# Patient Record
Sex: FEMALE | Race: WHITE | NOT HISPANIC OR LATINO | Employment: OTHER | URBAN - METROPOLITAN AREA
[De-identification: names, ages, dates, MRNs, and addresses within clinical notes are randomized per-mention and may not be internally consistent; named-entity substitution may affect disease eponyms.]

---

## 2017-02-22 PROBLEM — M70.61 GREATER TROCHANTERIC BURSITIS OF RIGHT HIP: Status: ACTIVE | Noted: 2017-02-22

## 2017-02-22 PROBLEM — M25.561 RIGHT KNEE PAIN: Status: ACTIVE | Noted: 2017-02-22

## 2017-03-27 PROBLEM — M75.42 ROTATOR CUFF IMPINGEMENT SYNDROME OF LEFT SHOULDER: Status: ACTIVE | Noted: 2017-03-27

## 2017-03-31 ENCOUNTER — TELEPHONE (OUTPATIENT)
Dept: NEUROSURGERY | Facility: CLINIC | Age: 62
End: 2017-03-31

## 2017-03-31 DIAGNOSIS — M54.5 LOW BACK PAIN, UNSPECIFIED BACK PAIN LATERALITY, UNSPECIFIED CHRONICITY, WITH SCIATICA PRESENCE UNSPECIFIED: Primary | ICD-10-CM

## 2017-04-04 ENCOUNTER — OFFICE VISIT (OUTPATIENT)
Dept: NEUROSURGERY | Facility: CLINIC | Age: 62
End: 2017-04-04
Payer: MEDICAID

## 2017-04-04 ENCOUNTER — HOSPITAL ENCOUNTER (OUTPATIENT)
Dept: RADIOLOGY | Facility: HOSPITAL | Age: 62
Discharge: HOME | End: 2017-04-04
Attending: NEUROLOGICAL SURGERY
Payer: MEDICAID

## 2017-04-04 VITALS
DIASTOLIC BLOOD PRESSURE: 70 MMHG | WEIGHT: 149 LBS | HEART RATE: 79 BPM | TEMPERATURE: 99 F | BODY MASS INDEX: 28.13 KG/M2 | SYSTOLIC BLOOD PRESSURE: 107 MMHG | HEIGHT: 61 IN

## 2017-04-04 DIAGNOSIS — M54.5 LOW BACK PAIN, UNSPECIFIED BACK PAIN LATERALITY, UNSPECIFIED CHRONICITY, WITH SCIATICA PRESENCE UNSPECIFIED: ICD-10-CM

## 2017-04-04 DIAGNOSIS — M54.16 LUMBAR RADICULAR PAIN: ICD-10-CM

## 2017-04-04 DIAGNOSIS — M50.10 CERVICAL DISC DISORDER WITH RADICULOPATHY: Primary | ICD-10-CM

## 2017-04-04 PROCEDURE — 99999 PR PBB SHADOW E&M-EST. PATIENT-LVL III: CPT | Mod: PBBFAC,,, | Performed by: NEUROLOGICAL SURGERY

## 2017-04-04 PROCEDURE — 72100 X-RAY EXAM L-S SPINE 2/3 VWS: CPT | Mod: 26,,, | Performed by: RADIOLOGY

## 2017-04-04 PROCEDURE — 99204 OFFICE O/P NEW MOD 45 MIN: CPT | Mod: S$PBB,,, | Performed by: NEUROLOGICAL SURGERY

## 2017-04-04 PROCEDURE — 99213 OFFICE O/P EST LOW 20 MIN: CPT | Mod: PBBFAC | Performed by: NEUROLOGICAL SURGERY

## 2017-04-04 PROCEDURE — 72120 X-RAY BEND ONLY L-S SPINE: CPT | Mod: 26,,, | Performed by: RADIOLOGY

## 2017-04-04 RX ORDER — ROSUVASTATIN CALCIUM 40 MG/1
40 TABLET, COATED ORAL NIGHTLY
COMMUNITY
End: 2017-06-15

## 2017-04-04 NOTE — PROGRESS NOTES
Subjective:    I, Luciano Salinas, am scribing for, and in the presence of, Dr. Manzanares.     Patient ID: Irish Felder is a 61 y.o. female.    Chief Complaint: Lumbar Spine Pain (L-Spine)    HPI   This is a 61 y.o. female presents complaining of several-year history of neck and back pain, treated in the past with pain medication, physical therapy, multiple injections, multiple rhizotomies, but has continued to have neck pain radiating to bilateral upper extremities and back pain radiating to bilateral lower extremities. Pain is equal on both sides. She elaborates having had 2 rhizotomies to her neck, 7 rhizotomies to her back, 6 injections to her back, without any significant relief. She also recently had a fall with injury to her right knee, currently undergoing physical therapy. She reports having had workup with MRI in the past, done over 1 year ago, which reportedly had shown bulging discs in her neck and back. She does not have her MRI scans with her today. Associated symptoms include paresthesias/numbness to her left hand including the 1st and 2nd digits.      Review of Systems   Constitutional: Negative for chills, fatigue and fever.   HENT: Negative for sinus pressure and trouble swallowing.    Eyes: Negative.  Negative for visual disturbance.   Respiratory: Negative.    Cardiovascular: Negative.    Gastrointestinal: Negative.  Negative for nausea and vomiting.   Endocrine: Negative.    Genitourinary: Negative.    Musculoskeletal: Positive for neck pain (radiates to bilateral upper extremities). Back pain: radiates to bilateral lower extremities.   Neurological: Positive for numbness (with paresthesias to bilateral lower extrmities. ). Negative for dizziness, seizures, syncope, speech difficulty and weakness.       Objective:      Physical Exam:    Constitutional: She appears well-developed.     Eyes: Pupils are equal, round, and reactive to light. Conjunctivae and EOM are normal.     Cardiovascular: Normal rate,  regular rhythm, normal pulses and intact distal pulses.     Abdominal: Soft.     Psych/Behavior: She is alert. She is oriented to person, place, and time. She has a normal mood and affect.     Musculoskeletal: Gait is normal.        Neck: Range of motion is full. There is no tenderness. Muscle strength is 5/5. Tone is normal.        Back: Range of motion is full. There is no tenderness. Muscle strength is 5/5. Tone is normal.        Right Upper Extremities: Range of motion is full. There is no tenderness. Muscle strength is 5/5. Tone is normal.        Left Upper Extremities: Range of motion is full. There is no tenderness. Muscle strength is 5/5. Tone is normal.       Right Lower Extremities: Range of motion is full. There is no tenderness. Muscle strength is 5/5. Tone is normal.        Left Lower Extremities: Range of motion is full. There is no tenderness. Muscle strength is 5/5. Tone is normal.     Neurological:        Coordination: She has a normal Romberg Test, normal finger to nose coordination, normal heel to shin coordination and normal tandem walking coordination.        Sensory: There is no sensory deficit in the trunk. There is no sensory deficit in the extremities.        DTRs: DTRs are normal. Tricep reflexes are 2+ on the right side and 2+ on the left side. Bicep reflexes are 2+ on the right side and 2+ on the left side. Brachioradialis reflexes are 2+ on the right side and 2+ on the left side. Patellar reflexes are 2+ on the right side and 2+ on the left side. Achilles reflexes are 2+ on the right side and 2+ on the left side. She displays no Babinski's sign on the right side. She displays no Babinski's sign on the left side.        Cranial nerves: Cranial nerve(s) II, III, IV, V, VI, VII, VIII, IX, X, XI and XII are intact.       Pt has no focal deficits.   There is evidence of possible cervical radiculopathy but no cervical myelopathy.   No Wilson's, no clonus.   Negative SLR.     Imaging:   XR  L-spine with Flexion/Extension, shows no fractures or malalignment.     Assessment/Plan:   Pt with long history of neck and back issues that have been managed in [place], now lives here. Unfortunately I have no records or any of her old imaging. Lumbar x-rays were non-diagnostic. I have advised her to obtain copies of her old scans from [place]. We will get updated imaging since she says it has been over a year since her last scan, and we will have her her follow up with one of our PAs in 4-6 weeks.     I, Dr. Manzanares, personally performed the services described in this documentation as scribed by Luciano Salinas in my presence, and it is both accurate and complete.

## 2017-05-10 PROBLEM — M17.11 PRIMARY OSTEOARTHRITIS OF RIGHT KNEE: Status: ACTIVE | Noted: 2017-05-10

## 2017-05-16 ENCOUNTER — INITIAL CONSULT (OUTPATIENT)
Dept: PHYSICAL MEDICINE AND REHAB | Facility: CLINIC | Age: 62
End: 2017-05-16
Payer: MEDICAID

## 2017-05-16 VITALS
DIASTOLIC BLOOD PRESSURE: 75 MMHG | HEART RATE: 81 BPM | WEIGHT: 149 LBS | HEIGHT: 61 IN | BODY MASS INDEX: 28.13 KG/M2 | SYSTOLIC BLOOD PRESSURE: 127 MMHG

## 2017-05-16 DIAGNOSIS — M17.11 PRIMARY OSTEOARTHRITIS OF RIGHT KNEE: ICD-10-CM

## 2017-05-16 DIAGNOSIS — G89.29 CHRONIC PAIN OF RIGHT KNEE: Primary | ICD-10-CM

## 2017-05-16 DIAGNOSIS — M25.561 CHRONIC PAIN OF RIGHT KNEE: Primary | ICD-10-CM

## 2017-05-16 PROCEDURE — 99999 PR PBB SHADOW E&M-EST. PATIENT-LVL II: CPT | Mod: PBBFAC,,, | Performed by: PHYSICAL MEDICINE & REHABILITATION

## 2017-05-16 PROCEDURE — 99212 OFFICE O/P EST SF 10 MIN: CPT | Mod: PBBFAC,PO | Performed by: PHYSICAL MEDICINE & REHABILITATION

## 2017-05-16 PROCEDURE — 99204 OFFICE O/P NEW MOD 45 MIN: CPT | Mod: S$PBB,,, | Performed by: PHYSICAL MEDICINE & REHABILITATION

## 2017-05-16 RX ORDER — LIDOCAINE 50 MG/G
1 PATCH TOPICAL DAILY
Qty: 30 PATCH | Refills: 1 | Status: SHIPPED | OUTPATIENT
Start: 2017-05-16 | End: 2017-06-15

## 2017-05-16 RX ORDER — PRAVASTATIN SODIUM 80 MG/1
TABLET ORAL
Refills: 0 | COMMUNITY
Start: 2017-05-11 | End: 2017-08-08

## 2017-05-16 RX ORDER — DICYCLOMINE HYDROCHLORIDE 10 MG/1
CAPSULE ORAL
Refills: 3 | COMMUNITY
Start: 2017-05-11 | End: 2017-06-15

## 2017-05-16 NOTE — LETTER
May 16, 2017      [place]  [address]  [address]  [facility]  [facility] [address]           [facility]  [address] [facility] [address]  Phone: [phone]  Fax: [phone]          Patient: Irish Felder   MR Number: [id number]   YOB: 1955   Date of Visit: 5/16/2017       Dear Dr. William Pack II:    Thank you for referring Irish Felder to me for evaluation. Attached you will find relevant portions of my assessment and plan of care.    If you have questions, please do not hesitate to call me. I look forward to following Irish Felder along with you.    Sincerely,    Adria Anguiano MD    Enclosure  CC:  No Recipients    If you would like to receive this communication electronically, please contact [email or web address] or [phone] to request more information on [facility] Link access.    For providers and/or their staff who would like to refer a patient to [facility], please contact us through our one-stop-shop provider referral line, [facility], at [phone].    If you feel you have received this communication in error or would no longer like to receive these types of communications, please e-mail [email or web address]

## 2017-05-17 NOTE — PROGRESS NOTES
[facility]    Consulting Provider: Dr. William Pack II    CHIEF COMPLAINT:   Chief Complaint   Patient presents with    Knee Pain     right knee pain - dr Pack referral     HISTORY OF PRESENT ILLNESS: Irish Felder is a 61 y.o. female who presents to me for the first time for evaluation and treatment of right knee pain.  She's had chronic pain in the right knee for the past few years.  She attributes increasing pain recently to a fall in January of this year.  She fell onto the anterior aspect of the knee.  She rates her pain as a 9 on a scale 1-10.  The pain is most prominent over the lateral aspect of the right knee.  The pain will radiate into the anterior shin at times.  She reports that massaging the lateral knee seems to reduce the pain.  She did receive a injection of corticosteroid to the right knee last week, but unfortunately she notes minimal relief from this.  She notes occasional mild swelling of the knee.  She denies any locking symptoms.  Her pain is relatively constant.  She is not currently taking any oral medicine for pain.  She has tried a topical compounded pain cream without relief.  The pain is achy in nature.  The pain is increased with increased use.  She denies any numbness or tingling of the right lower extremity.    Review of Systems   Constitutional: Negative for fever.   HENT: Negative for drooling.    Eyes: Negative for discharge.   Respiratory: Negative for choking.    Cardiovascular: Negative for chest pain.   Genitourinary: Negative for flank pain.   Skin: Negative for wound.   Allergic/Immunologic: Negative for immunocompromised state.   Neurological: Negative for tremors and syncope.   Psychiatric/Behavioral: Negative for behavioral problems.     Past Medical History:   Past Medical History:   Diagnosis Date    Anemia     Cancer     skin cancer - nose    GERD (gastroesophageal reflux disease)     Hyperlipidemia     Seasonal allergies         Past Surgical History:   Past Surgical History:   Procedure Laterality Date    CATARACT EXTRACTION  10/15/12    right eye    CATARACT EXTRACTION  10/1/12    left eye    COLONOSCOPY      EYE SURGERY      bilateral cataracts    LASIK      OU    UPPER GASTROINTESTINAL ENDOSCOPY         Family History:   Family History   Problem Relation Age of Onset    Cataracts Mother     Cancer Mother      lung    Diabetes Father     Cancer Father      lung    Cataracts Sister        Medications:   Current Outpatient Prescriptions on File Prior to Visit   Medication Sig Dispense Refill    calcium carbonate (CALCIUM CARBONATE) 300 mg (750 mg) Chew Take by mouth as needed.        chlorphen-pseudoeph-ibuprofen (ADVIL ALLERGY SINUS) 2- mg Tab Take by mouth.        cyanocobalamin 1,000 mcg/mL injection       fenofibrate (TRICOR) 54 MG tablet       fluoxetine (PROZAC) 20 MG capsule Take 20 mg by mouth once daily.        furosemide (LASIX) 20 MG tablet TAKE 1 TABLET PO QD PRN  3    lansoprazole (PREVACID) 15 MG capsule TK ONE C PO BID  6    multivitamin capsule Take 1 capsule by mouth once daily.        nitroGLYCERIN (NITROSTAT) 0.4 MG SL tablet TK ONE T PO PRN  6    pantoprazole (PROTONIX) 40 MG tablet TK 1 T PO QD  3    potassium chloride (MICRO-K) 10 MEQ CpSR TAKE 1 CAPSULE PO QD PRN WITH FOOD  3    promethazine (PHENERGAN) 25 MG tablet TK 1 T PO Q 4 TO 6 H PRN N  0    pseudoephedrine-guaifenesin  mg (MUCINEX D)  mg per tablet Take 1 tablet by mouth every 12 (twelve) hours.        rosuvastatin (CRESTOR) 40 MG Tab Take 40 mg by mouth every evening.      trazodone (DESYREL) 50 MG tablet Take 50 mg by mouth every evening. Takes 1/2 tablet HS       No current facility-administered medications on file prior to visit.        Allergies: Review of patient's allergies indicates:  No Known Allergies    Social History:   Social History     Social History    Marital status:      Spouse name:  "N/A    Number of children: N/A    Years of education: N/A     Social History Main Topics    Smoking status: Never Smoker    Smokeless tobacco: None    Alcohol use Yes      Comment: socially     Drug use: No    Sexual activity: Not Asked     Other Topics Concern    None     Social History Narrative     PHYSICAL EXAMINATION:   General    Vitals:    05/16/17 1506   BP: 127/75   Pulse: 81   Weight: 67.6 kg (149 lb)   Height: 5' 1" (1.549 m)     Constitutional: Oriented to person, place, and time. No apparent distress. Appears well-developed and well-nourished. Pleasant.  HENT:   Head: Normocephalic and atraumatic.   Eyes: Right eye exhibits no discharge. Left eye exhibits no discharge. No scleral icterus.   Pulmonary/Chest: Effort normal. No respiratory distress.   Abdominal: There is no guarding.   Neurological: Alert and oriented to person, place, and time.   Psychiatric: Behavior is normal.   Right Knee Exam     Tenderness   The patient is experiencing tenderness in the lateral joint line (There is also some tenderness at the lateral retropatellar facet).    Range of Motion   Extension: 0   Flexion: 140 (There is some lateral knee pain with terminal flexion)     Tests   Florence:  Medial - negative Lateral - positive  Lachman:  Anterior - negative    Posterior - negative  Varus: negative  Valgus: negative  Patellar Apprehension: negative    Other   Erythema: absent  Scars: absent  Sensation: normal  Pulse: present  Swelling: mild  Other tests: no effusion present      Left Knee Exam   Left knee exam is normal.    Tenderness   The patient is experiencing no tenderness.         Range of Motion   Extension: normal   Flexion: normal     Muscle Strength     The patient has normal left knee strength.    Other   Erythema: absent  Scars: absent  Sensation: normal  Pulse: present  Swelling: none  Effusion: no effusion present        INSPECTION: There is no ecchymoses, erythema or gross deformity about the right " knee.  GAIT/DYNAMIC: Her gait is normal.    Imaging  MRI of the right knee from 5/1/2017: 1. Flap tear reaching the inferior articular surface of the posterior horn of the lateral meniscus is suspected with free edge truncation suggestive of a radial component to the tear.  2. Flap tear reaching the inferior articular surface of the posterior horn and posterior medial corner of the medial meniscus is suspected.  3. Mild increased signal about the MCL may be related to reactive changes from the medial compartment pathology in the absence of history or clinical findings suggestive of MCL injury. If there is evidence of MCL injury, low grade MCL strain may be considered.  4. Mild quadriceps tendinosis.  5. Tricompartmental arthritic changes are seen, most pronounced in the patellofemoral compartment.  6. Moderate joint effusion. Baker's cyst. Additional findings and details as above.    X-ray of the right knee from 2/22/2017: Mild narrowing of the medial compartment, mild periarticular subchondral sclerosis both medial and lateral compartment bilaterally, right knee patellofemoral joint moderate joint space narrowing and possible degenerative subchondral cystic change at the medial facet.    Data Reviewed: X-ray, MRI    Supportive Actions: Independent visualization of images or test specimens    ASSESSMENT:   1. Chronic pain of right knee    2. Primary osteoarthritis of right knee      PLAN:     1. Time was spent reviewing the above diagnosis in depth with Irish today, including acute management and rehabilitation.     2.  Ms. Felder his signs, symptoms, and MRI findings suggestive of degenerative arthrosis about the right knee.  Her pain is been relatively recalcitrant to conservative treatment thus far.  The pain is currently limiting her typical activities.  She was deemed less than ideal candidate for knee surgical intervention by orthopedic surgery.  We discussed utility of the genicular nerve radiofrequency  "ablation procedure.  Successful this may give her pain reduction for several months allowing her to be more physically active and reducing her need for chronic pain medicine.  She would like to proceed with this option and we will first proceed with a diagnostic genicular nerve block.    3.  I encouraged her to continue her physical therapy when she returns from her trip next month.     4.  I prescribed Lidoderm patches to apply to her painful areas to see if we can give her some pain reduction locally before we are able to schedule the diagnostic block.    5.  Return in about one month after her trip for the diagnostic block of the right knee genicular nerves.    This is a consult from Dr. William Pack II. Please see the "Communications" section of Epic to see how the consulting physician received the report of today's findings and recommendations. If it's an [facility] physician, it will be forwarded to his/her "in basket".    The above note was completed, in part, with the aid of Dragon dictation software/hardware. Translation errors may be present.    "

## 2017-05-24 DIAGNOSIS — M17.11 PRIMARY OSTEOARTHRITIS OF RIGHT KNEE: Primary | ICD-10-CM

## 2017-05-24 DIAGNOSIS — M17.11 OSTEOARTHRITIS OF RIGHT KNEE: ICD-10-CM

## 2017-05-24 RX ORDER — LIDOCAINE HYDROCHLORIDE 10 MG/ML
1 INJECTION, SOLUTION EPIDURAL; INFILTRATION; INTRACAUDAL; PERINEURAL ONCE
Status: CANCELLED | OUTPATIENT
Start: 2017-05-24 | End: 2017-05-24

## 2017-05-24 RX ORDER — MIDAZOLAM HYDROCHLORIDE 5 MG/ML
2 INJECTION INTRAMUSCULAR; INTRAVENOUS ONCE AS NEEDED
Status: CANCELLED | OUTPATIENT
Start: 2017-05-24 | End: 2017-05-24

## 2017-05-24 RX ORDER — SODIUM CHLORIDE, SODIUM LACTATE, POTASSIUM CHLORIDE, CALCIUM CHLORIDE 600; 310; 30; 20 MG/100ML; MG/100ML; MG/100ML; MG/100ML
INJECTION, SOLUTION INTRAVENOUS CONTINUOUS
Status: CANCELLED | OUTPATIENT
Start: 2017-05-24

## 2017-06-06 ENCOUNTER — TELEPHONE (OUTPATIENT)
Dept: PHYSICAL MEDICINE AND REHAB | Facility: CLINIC | Age: 62
End: 2017-06-06

## 2017-06-06 NOTE — TELEPHONE ENCOUNTER
----- Message from Destini Akbar sent at 6/5/2017  2:37 PM CDT -----  Contact: Mirlande ([facility]) [phone]   Please call regarding peer to peer

## 2017-06-06 NOTE — TELEPHONE ENCOUNTER
Spoke with Aura at medicaid. They will need a form filled out by the patient and submitted back to them in order to begin the peer to peer

## 2017-06-09 ENCOUNTER — TELEPHONE (OUTPATIENT)
Dept: PHYSICAL MEDICINE AND REHAB | Facility: CLINIC | Age: 62
End: 2017-06-09

## 2017-06-09 NOTE — TELEPHONE ENCOUNTER
----- Message from Irish Carbajal sent at 6/9/2017  9:10 AM CDT -----  Contact: Mayra Carroll is calling as insurance denied procedure and patches to be used for pain. Please call [phone]. Thanks!

## 2017-06-09 NOTE — TELEPHONE ENCOUNTER
Call placed. Spoke with patient. She was out of town for 3 weeks and returned home yesterday to a denial letter from medicaid for her nerve block that is scheduled on Friday 6/16 with Dr Anguiano. On 5/18, she also received a denial letter for lidoderm patches. She left to go out of town with out the patches and hasn't had anything since her last visit with Dr. Anguiano. She's still having pain. She will receive a brace next Tuesday that she's hoping with relieve some of the pain. I explained to her that Dr Annmarie Caicedo's nurse and Dr Anguiano are both out of the office today, but I would have Marifer call her on Monday with an update on her procedure. There was previous notes about a possible peer to peer. She states pain is tolerable now and doesn't need anything right now but would like a follow up call on Monday.

## 2017-06-12 NOTE — TELEPHONE ENCOUNTER
Olga ms Felder-        We are doing an appeal on the case. I will need for you to come by and sign a form from medicaid that will allow us to speak to medicaid on your behalf. I will be in [place] today and in [facility] tomorrow. Please let me know when you zander  l be able to sign the form.    Thanks  siri

## 2017-06-20 ENCOUNTER — TELEPHONE (OUTPATIENT)
Dept: PHYSICAL MEDICINE AND REHAB | Facility: CLINIC | Age: 62
End: 2017-06-20

## 2017-06-20 NOTE — TELEPHONE ENCOUNTER
----- Message from Octavia Mojica sent at 6/16/2017  8:25 AM CDT -----  Contact: [facility]  Needs the PAR form that was faxed over to be signed by the doctor and refaxed.  Please call back at   Fax

## 2017-06-21 ENCOUNTER — TELEPHONE (OUTPATIENT)
Dept: PHYSICAL MEDICINE AND REHAB | Facility: CLINIC | Age: 62
End: 2017-06-21

## 2017-06-21 NOTE — TELEPHONE ENCOUNTER
Spoke with patient and advised that the peer to peer is set up for 6/27 with medicaid. That is the first time they could schedule it. She asked that we keep her informed.

## 2017-06-21 NOTE — TELEPHONE ENCOUNTER
----- Message from Quan BA Frisard sent at 6/21/2017  8:38 AM CDT -----  Contact: same  Unsuccessful call placed to pod.  Patient called in and wanted to make sure she was supposed to still come in today Wednesday 6/21/17 for her knee block at 11:30am.    Patient call back number is [phone]

## 2017-08-03 ENCOUNTER — TELEPHONE (OUTPATIENT)
Dept: PHYSICAL MEDICINE AND REHAB | Facility: CLINIC | Age: 62
End: 2017-08-03

## 2017-08-03 NOTE — TELEPHONE ENCOUNTER
----- Message from Yoli Marvin sent at 8/3/2017 11:14 AM CDT -----  Contact: Patient  Patient states that she would like to talk to you about an issue in which Medicaid did not approve.  Can you please call the patient back at [phone].  Thank you

## 2017-08-03 NOTE — TELEPHONE ENCOUNTER
Spoke with [facility] connections regarding peer to peer that was done on ms harrison. They do not have any notes on this peer to peer they will call us again on Monday to set that up and we can move forward from there. Reference # for medicaid is [id number]

## 2017-08-03 NOTE — TELEPHONE ENCOUNTER
Patient is following up on her approval for cooled rf from medicaid. I told her I would contact [facility] dept to find out who to call and get back with her once I found out what was going on. Ms Lidia Cam in [facility] dept gave me the phone  Number [phone] to call to follow up.

## 2017-08-10 ENCOUNTER — TELEPHONE (OUTPATIENT)
Dept: PHYSICAL MEDICINE AND REHAB | Facility: CLINIC | Age: 62
End: 2017-08-10

## 2017-08-10 NOTE — TELEPHONE ENCOUNTER
Dr Anguiano would like to know the status of this patient case. It was supposed to be resubmitted so that we can do a peer to peer.  Thanks  Marifer

## 2017-08-10 NOTE — TELEPHONE ENCOUNTER
Spoke with Ms Xuan Levy in auth dept and she is going to resubmit the case to [facility] connections today so that we can again move forward and set up a peer to peer for this patient.

## 2017-08-17 DIAGNOSIS — M17.11 PRIMARY OSTEOARTHRITIS OF RIGHT KNEE: Primary | ICD-10-CM

## 2017-08-17 RX ORDER — LIDOCAINE HYDROCHLORIDE 10 MG/ML
1 INJECTION, SOLUTION EPIDURAL; INFILTRATION; INTRACAUDAL; PERINEURAL ONCE
Status: CANCELLED | OUTPATIENT
Start: 2017-08-17 | End: 2017-08-17

## 2017-08-17 RX ORDER — MIDAZOLAM HYDROCHLORIDE 5 MG/ML
2 INJECTION INTRAMUSCULAR; INTRAVENOUS ONCE AS NEEDED
Status: CANCELLED | OUTPATIENT
Start: 2017-08-17 | End: 2017-08-17

## 2017-08-17 RX ORDER — SODIUM CHLORIDE, SODIUM LACTATE, POTASSIUM CHLORIDE, CALCIUM CHLORIDE 600; 310; 30; 20 MG/100ML; MG/100ML; MG/100ML; MG/100ML
INJECTION, SOLUTION INTRAVENOUS CONTINUOUS
Status: CANCELLED | OUTPATIENT
Start: 2017-08-17

## 2017-09-05 DIAGNOSIS — M25.561 RIGHT KNEE PAIN: Primary | ICD-10-CM

## 2017-09-06 RX ORDER — ASPIRIN 81 MG/1
81 TABLET ORAL DAILY
Status: ON HOLD | COMMUNITY
End: 2019-12-09 | Stop reason: HOSPADM

## 2017-09-08 ENCOUNTER — SURGERY (OUTPATIENT)
Age: 62
End: 2017-09-08

## 2017-09-08 ENCOUNTER — HOSPITAL ENCOUNTER (OUTPATIENT)
Facility: HOSPITAL | Age: 62
Discharge: HOME | End: 2017-09-08
Attending: PHYSICAL MEDICINE & REHABILITATION | Admitting: PHYSICAL MEDICINE & REHABILITATION
Payer: MEDICAID

## 2017-09-08 ENCOUNTER — HOSPITAL ENCOUNTER (OUTPATIENT)
Dept: RADIOLOGY | Facility: HOSPITAL | Age: 62
Discharge: HOME | End: 2017-09-08
Attending: PHYSICAL MEDICINE & REHABILITATION | Admitting: PHYSICAL MEDICINE & REHABILITATION
Payer: MEDICAID

## 2017-09-08 VITALS
BODY MASS INDEX: 29.45 KG/M2 | TEMPERATURE: 98 F | WEIGHT: 156 LBS | SYSTOLIC BLOOD PRESSURE: 110 MMHG | HEART RATE: 67 BPM | RESPIRATION RATE: 16 BRPM | OXYGEN SATURATION: 98 % | DIASTOLIC BLOOD PRESSURE: 60 MMHG | HEIGHT: 61 IN

## 2017-09-08 DIAGNOSIS — M17.11 PRIMARY OSTEOARTHRITIS OF RIGHT KNEE: ICD-10-CM

## 2017-09-08 DIAGNOSIS — G89.29 CHRONIC PAIN OF RIGHT KNEE: Primary | ICD-10-CM

## 2017-09-08 DIAGNOSIS — M25.561 RIGHT KNEE PAIN: ICD-10-CM

## 2017-09-08 DIAGNOSIS — M25.561 CHRONIC PAIN OF RIGHT KNEE: Primary | ICD-10-CM

## 2017-09-08 PROCEDURE — 25000003 PHARM REV CODE 250: Mod: PO | Performed by: PHYSICAL MEDICINE & REHABILITATION

## 2017-09-08 PROCEDURE — 63600175 PHARM REV CODE 636 W HCPCS: Mod: PO | Performed by: PHYSICAL MEDICINE & REHABILITATION

## 2017-09-08 PROCEDURE — 76000 FLUOROSCOPY <1 HR PHYS/QHP: CPT | Mod: TC,PO

## 2017-09-08 PROCEDURE — 64450 NJX AA&/STRD OTHER PN/BRANCH: CPT | Mod: RT,,, | Performed by: PHYSICAL MEDICINE & REHABILITATION

## 2017-09-08 PROCEDURE — 64450 NJX AA&/STRD OTHER PN/BRANCH: CPT | Mod: PO | Performed by: PHYSICAL MEDICINE & REHABILITATION

## 2017-09-08 RX ORDER — SODIUM CHLORIDE, SODIUM LACTATE, POTASSIUM CHLORIDE, CALCIUM CHLORIDE 600; 310; 30; 20 MG/100ML; MG/100ML; MG/100ML; MG/100ML
INJECTION, SOLUTION INTRAVENOUS CONTINUOUS
Status: DISCONTINUED | OUTPATIENT
Start: 2017-09-08 | End: 2017-09-08 | Stop reason: HOSPADM

## 2017-09-08 RX ORDER — MIDAZOLAM HYDROCHLORIDE 2 MG/2ML
2 INJECTION, SOLUTION INTRAMUSCULAR; INTRAVENOUS ONCE
Status: COMPLETED | OUTPATIENT
Start: 2017-09-08 | End: 2017-09-08

## 2017-09-08 RX ORDER — LIDOCAINE HYDROCHLORIDE 10 MG/ML
INJECTION INFILTRATION; PERINEURAL
Status: DISCONTINUED | OUTPATIENT
Start: 2017-09-08 | End: 2017-09-08 | Stop reason: HOSPADM

## 2017-09-08 RX ORDER — BUPIVACAINE HYDROCHLORIDE 2.5 MG/ML
INJECTION, SOLUTION EPIDURAL; INFILTRATION; INTRACAUDAL
Status: DISCONTINUED | OUTPATIENT
Start: 2017-09-08 | End: 2017-09-08 | Stop reason: HOSPADM

## 2017-09-08 RX ORDER — MIDAZOLAM HYDROCHLORIDE 5 MG/ML
2 INJECTION INTRAMUSCULAR; INTRAVENOUS ONCE AS NEEDED
Status: COMPLETED | OUTPATIENT
Start: 2017-09-08 | End: 2017-09-08

## 2017-09-08 RX ORDER — LIDOCAINE HYDROCHLORIDE 10 MG/ML
1 INJECTION, SOLUTION EPIDURAL; INFILTRATION; INTRACAUDAL; PERINEURAL ONCE
Status: DISCONTINUED | OUTPATIENT
Start: 2017-09-08 | End: 2017-09-08 | Stop reason: HOSPADM

## 2017-09-08 RX ADMIN — BUPIVACAINE HYDROCHLORIDE 3 ML: 2.5 INJECTION, SOLUTION EPIDURAL; INFILTRATION; INTRACAUDAL; PERINEURAL at 07:09

## 2017-09-08 RX ADMIN — MIDAZOLAM HYDROCHLORIDE 2 MG: 5 INJECTION, SOLUTION INTRAMUSCULAR; INTRAVENOUS at 07:09

## 2017-09-08 RX ADMIN — SODIUM CHLORIDE, SODIUM LACTATE, POTASSIUM CHLORIDE, AND CALCIUM CHLORIDE: .6; .31; .03; .02 INJECTION, SOLUTION INTRAVENOUS at 06:09

## 2017-09-08 RX ADMIN — LIDOCAINE HYDROCHLORIDE 16 ML: 10 INJECTION, SOLUTION INFILTRATION; PERINEURAL at 07:09

## 2017-09-08 RX ADMIN — MIDAZOLAM HYDROCHLORIDE 1 MG: 1 INJECTION, SOLUTION INTRAMUSCULAR; INTRAVENOUS at 07:09

## 2017-09-08 NOTE — OP NOTE
Operative Note       Surgery Date: 9/8/2017     Surgeon(s) and Role:     * Adria Anguiano MD - Primary    Pre-op Diagnosis:  Primary osteoarthritis of right knee [M17.11]    Post-op Diagnosis: Post-Op Diagnosis Codes:     * Primary osteoarthritis of right knee [M17.11]    Procedure(s) (LRB):  diagnostic block of the genicular branches to the right knee (Right) x 3 nerve branches  Fluoroscopic guidance    Anesthesia: RN IV Sedation    Procedure in Detail/Findings:    Description of Procedure:     A 62 y.o. male with chronic right knee pain presents for an elective genicular nerve branch block X3 for the right knee. We discussed risks, benefits, and alternatives. He has failed conservation care. We are going to see if a water-cooled radiofrequency ablation procedure to the genicular nerve branches would benefit his knee pain. Patient's verbal and written consent was obtained. He was brought to the fluoro scopic suite, placed in supine position. Right knee was prepped in the usual sterile fashion. Using AP and lateral views, with 1 % lidocaine with a 27g needle, starting with a skin wheal down to the periosteum the area was injected for local anesthesia in each of the three sites of the genicular branches. Next, a 25-gauge needle was introduced down to the proximal medial tibial metaphysis. A similar needle was placed at the lateral aspect of the distal femur. A third introducer needle was guided to the medial aspect of the distal femur. All 3 needles being placed near or around the genicular nerve origin prior to entering the right knee joint. AP views were obtained to assure proper location then lateral fluoroscopic views were obtained to assure the needle was at the appropriate mid shaft location. Once this was obtained, then the stylette was removed in each needle and 1cc of 0.25% Marcaine was injected. The stylette was replaced after each injection. The needles were then removed and a sterile dressing with  antibiotic ointment was applied. The patient was returned to the recovery area in stable condition. There were no complications. Neurovascular exam was performed and revealed no injury. Patient was discharged to follow up in the clinic in several days. We discussed postoperative protocol including pain diary recording and voiced understanding.       Estimated Blood Loss: Minimal           Specimens     None                   Condition: Good    Attestation:  I performed the procedure.           Discharge Note    Admit Date: 9/8/2017    Attending Physician: Adria Anguiano MD     Discharge Physician: Adria Anguiano MD    Final Diagnosis: Post-Op Diagnosis Codes:     * Primary osteoarthritis of right knee [M17.11]    Disposition: Home or Self Care, pt discharged in good condition and will f/u by phone is 3 days.    Patient Instructions:   Current Discharge Medication List      CONTINUE these medications which have NOT CHANGED    Details   aspirin (ECOTRIN) 81 MG EC tablet Take 81 mg by mouth once daily.      calcium carbonate (CALCIUM CARBONATE) 300 mg (750 mg) Chew Take by mouth as needed.        cyanocobalamin 1,000 mcg/mL injection Inject 1,000 mcg into the skin.       diclofenac sodium (VOLTAREN) 1 % Gel Apply 2 g topically 4 (four) times daily.  Qty: 4 Tube, Refills: 1    Associated Diagnoses: Primary osteoarthritis of right knee      furosemide (LASIX) 20 MG tablet TAKE 1 TABLET PO QD PRN  Refills: 3      lansoprazole (PREVACID) 15 MG capsule TK ONE C PO BID  Refills: 6      multivitamin capsule Take 1 capsule by mouth once daily.        pantoprazole (PROTONIX) 40 MG tablet TK 1 T PO QD  Refills: 3      potassium chloride (MICRO-K) 10 MEQ CpSR TAKE 1 CAPSULE PO QD PRN WITH FOOD  Refills: 3      rosuvastatin (CRESTOR) 40 MG Tab TK 1 T PO QD  Refills: 2      trazodone (DESYREL) 50 MG tablet Take 50 mg by mouth every evening. Takes 1/2 tablet HS      nitroGLYCERIN (NITROSTAT) 0.4 MG SL tablet TK ONE T  PO PRN  Refills: 6             Discharge Procedure Orders (must include Diet, Follow-up, Activity)    Discharge Procedure Orders (must include Diet, Follow-up, Activity)  Diet general     Activity as tolerated     Shower on day dressing removed (No bath)     Ice to affected area     Call MD for:  temperature >100.4     Call MD for:  persistent nausea and vomiting     Call MD for:  severe uncontrolled pain     Call MD for:  difficulty breathing, headache or visual disturbances     Call MD for:  redness, tenderness, or signs of infection (pain, swelling, redness, odor or green/yellow discharge around incision site)     Call MD for:  hives     Call MD for:  persistent dizziness or light-headedness     Call MD for:  extreme fatigue     No dressing needed          Discharge Date: 9/8/17

## 2017-09-08 NOTE — H&P
CHIEF COMPLAINT:        Chief Complaint   Patient presents with    Knee Pain       right knee pain - dr Pack referral      HISTORY OF PRESENT ILLNESS: Irish Felder is a 61 y.o. female who presents to me today for dx block of the genicular nerves to the right knee. She notes persistent pain since last visit.    Previous HPI  She's had chronic pain in the right knee for the past few years.  She attributes increasing pain recently to a fall in January of this year.  She fell onto the anterior aspect of the knee.  She rates her pain as a 9 on a scale 1-10.  The pain is most prominent over the lateral aspect of the right knee.  The pain will radiate into the anterior shin at times.  She reports that massaging the lateral knee seems to reduce the pain.  She did receive a injection of corticosteroid to the right knee last week, but unfortunately she notes minimal relief from this.  She notes occasional mild swelling of the knee.  She denies any locking symptoms.  Her pain is relatively constant.  She is not currently taking any oral medicine for pain.  She has tried a topical compounded pain cream without relief.  The pain is achy in nature.  The pain is increased with increased use.  She denies any numbness or tingling of the right lower extremity.     Review of Systems   Constitutional: Negative for fever.   HENT: Negative for drooling.    Eyes: Negative for discharge.   Respiratory: Negative for choking.    Cardiovascular: Negative for chest pain.   Genitourinary: Negative for flank pain.   Skin: Negative for wound.   Allergic/Immunologic: Negative for immunocompromised state.   Neurological: Negative for tremors and syncope.   Psychiatric/Behavioral: Negative for behavioral problems.      Past Medical History:        Past Medical History:   Diagnosis Date    Anemia      Cancer       skin cancer - nose    GERD (gastroesophageal reflux disease)      Hyperlipidemia      Seasonal allergies           Past  Surgical History:         Past Surgical History:   Procedure Laterality Date    CATARACT EXTRACTION   10/15/12     right eye    CATARACT EXTRACTION   10/1/12     left eye    COLONOSCOPY        EYE SURGERY         bilateral cataracts    LASIK         OU    UPPER GASTROINTESTINAL ENDOSCOPY             Family History:   Family History   Problem Relation Age of Onset    Cataracts Mother      Cancer Mother         lung    Diabetes Father      Cancer Father         lung    Cataracts Sister           Medications:          Current Outpatient Prescriptions on File Prior to Visit   Medication Sig Dispense Refill    calcium carbonate (CALCIUM CARBONATE) 300 mg (750 mg) Chew Take by mouth as needed.          chlorphen-pseudoeph-ibuprofen (ADVIL ALLERGY SINUS) 2- mg Tab Take by mouth.          cyanocobalamin 1,000 mcg/mL injection          fenofibrate (TRICOR) 54 MG tablet          fluoxetine (PROZAC) 20 MG capsule Take 20 mg by mouth once daily.          furosemide (LASIX) 20 MG tablet TAKE 1 TABLET PO QD PRN   3    lansoprazole (PREVACID) 15 MG capsule TK ONE C PO BID   6    multivitamin capsule Take 1 capsule by mouth once daily.          nitroGLYCERIN (NITROSTAT) 0.4 MG SL tablet TK ONE T PO PRN   6    pantoprazole (PROTONIX) 40 MG tablet TK 1 T PO QD   3    potassium chloride (MICRO-K) 10 MEQ CpSR TAKE 1 CAPSULE PO QD PRN WITH FOOD   3    promethazine (PHENERGAN) 25 MG tablet TK 1 T PO Q 4 TO 6 H PRN N   0    pseudoephedrine-guaifenesin  mg (MUCINEX D)  mg per tablet Take 1 tablet by mouth every 12 (twelve) hours.          rosuvastatin (CRESTOR) 40 MG Tab Take 40 mg by mouth every evening.        trazodone (DESYREL) 50 MG tablet Take 50 mg by mouth every evening. Takes 1/2 tablet HS          No current facility-administered medications on file prior to visit.          Allergies: Review of patient's allergies indicates:  No Known Allergies     Social History:   Social History             Social History    Marital status:        Spouse name: N/A    Number of children: N/A    Years of education: N/A              Social History Main Topics     Smoking status: Never Smoker     Smokeless tobacco: None     Alcohol use Yes         Comment: socially      Drug use: No     Sexual activity: Not Asked            Other Topics Concern    None      Social History Narrative      PHYSICAL EXAMINATION:   General           VSS   Constitutional: Oriented to person, place, and time. No apparent distress. Appears well-developed and well-nourished. Pleasant.  HENT:   Head: Normocephalic and atraumatic.   Eyes: Right eye exhibits no discharge. Left eye exhibits no discharge. No scleral icterus.   Pulmonary/Chest: Effort normal. No respiratory distress.   Abdominal: There is no guarding.   Neurological: Alert and oriented to person, place, and time.   Psychiatric: Behavior is normal.   Right Knee Exam      Tenderness   The patient is experiencing tenderness in the lateral joint line (There is also some tenderness at the lateral retropatellar facet).     Range of Motion   Extension: 0   Flexion: 140 (There is some lateral knee pain with terminal flexion)      Tests   Florence:  Medial - negative Lateral - positive  Lachman:  Anterior - negative    Posterior - negative  Varus: negative  Valgus: negative  Patellar Apprehension: negative     Other   Erythema: absent  Scars: absent  Sensation: normal  Pulse: present  Swelling: mild  Other tests: no effusion present        Left Knee Exam   Left knee exam is normal.     Tenderness   The patient is experiencing no tenderness.            Range of Motion   Extension: normal   Flexion: normal      Muscle Strength      The patient has normal left knee strength.     Other   Erythema: absent  Scars: absent  Sensation: normal  Pulse: present  Swelling: none  Effusion: no effusion present        INSPECTION: There is no ecchymoses, erythema or gross deformity about the  right knee.  GAIT/DYNAMIC: Her gait is normal.     Imaging  MRI of the right knee from 5/1/2017: 1. Flap tear reaching the inferior articular surface of the posterior horn of the lateral meniscus is suspected with free edge truncation suggestive of a radial component to the tear.  2. Flap tear reaching the inferior articular surface of the posterior horn and posterior medial corner of the medial meniscus is suspected.  3. Mild increased signal about the MCL may be related to reactive changes from the medial compartment pathology in the absence of history or clinical findings suggestive of MCL injury. If there is evidence of MCL injury, low grade MCL strain may be considered.  4. Mild quadriceps tendinosis.  5. Tricompartmental arthritic changes are seen, most pronounced in the patellofemoral compartment.  6. Moderate joint effusion. Baker's cyst. Additional findings and details as above.     X-ray of the right knee from 2/22/2017: Mild narrowing of the medial compartment, mild periarticular subchondral sclerosis both medial and lateral compartment bilaterally, right knee patellofemoral joint moderate joint space narrowing and possible degenerative subchondral cystic change at the medial facet.     Data Reviewed: X-ray, MRI     Supportive Actions: Independent visualization of images or test specimens     ASSESSMENT:   1. Chronic pain of right knee    2. Primary osteoarthritis of right knee       PLAN:      1. We will proceed today with dx block of the right knee genicular branches. Pt consented.    2. We will contact her by phone in 3 days to assess her response to today's procedure.

## 2017-09-08 NOTE — DISCHARGE INSTRUCTIONS
Diagnostic Block Pain Diary  Name:______________________________________       Directions:   At each time point listed below, please rate your pain using the scale below     Average pain before the nerve block:_____________________                      Hour after procedure                        Pain Level   1    2    3    4    8    12    16    20    24                Home care instructions  Apply ice pack to the injection site for 20 minutes periods for the first 24 hrs for soreness/discomfort at injection site DO NOT USE HEAT FOR 24 HOURS  Keep site clean and dry for 24 hours, remove bandaid when desired  Do not drive until tomorrow  Take care when walking after an injection  Avoid strenuous activities for 2 days  FILL OUT PAIN LOG  Resume home medication as prescribed today  Resume Aspirin, Plavix, or Coumadin the day after the procedure unless otherwise instructed.    SEE IMMEDIATE MEDICAL HELP FOR:  Severe increase in your usual pain or appearance of new pain  Prolonged or increasing weakness or numbness in the legs or arms  Drainage, redness, active bleeding, or increased swelling at the injection site  Temperature over 100.0 degrees F.  Headache that increases when your head is upright and decreases when you lie flat    CALL 911 OR GO DIRECTLY TO EMERGENCY DEPARTMENT FOR:  Shortness of breath, chest pain, or problems breathing      Recovery After Procedural Sedation (Adult)  You have been given medicine by vein to make you sleep during your surgery. This may have included both a pain medicine and sleeping medicine. Most of the effects have worn off. But you may still have some drowsiness for the next 6 to 8 hours.  Home care  Follow these guidelines when you get home:  · For the next 8 hours, you should be watched by a responsible adult. This person should make sure your condition is not getting worse.  · Don't drink any alcohol for the next 24 hours.  · Don't drive, operate dangerous machinery, or make  important business or personal decisions during the next 24 hours.  Note: Your healthcare provider may tell you not to take any medicine by mouth for pain or sleep in the next 4 hours. These medicines may react with the medicines you were given in the hospital. This could cause a much stronger response than usual.  Follow-up care  Follow up with your healthcare provider if you are not alert and back to your usual level of activity within 12 hours.  When to seek medical advice  Call your healthcare provider right away if any of these occur:  · Drowsiness gets worse  · Weakness or dizziness gets worse  · Repeated vomiting  · You can't be awakened   Date Last Reviewed: 10/18/2016  © 1965-2381 [employer]. [address] [place] [address], [place] [address]. All rights reserved. This information is not intended as a substitute for professional medical care. Always follow your healthcare professional's instructions.        Discharge Instructions: After Your Surgery  Youve just had surgery. During surgery, you were given medicine called anesthesia to keep you relaxed and free of pain. After surgery, you may have some pain or nausea. This is common. Here are some tips for feeling better and getting well after surgery.     Stay on schedule with your medicine.   Going home  Your healthcare provider will show you how to take care of yourself when you go home. He or she will also answer your questions. Have an adult family member or friend drive you home. For the first 24 hours after your surgery:  · Do not drive or use heavy equipment.  · Do not make important decisions or sign legal papers.  · Do not drink alcohol.  · Have someone stay with you, if needed. He or she can watch for problems and help keep you safe.  Be sure to go to all follow-up visits with your healthcare provider. And rest after your surgery for as long as your healthcare provider tells you to.  Coping with pain  If you have pain after surgery, pain  medicine will help you feel better. Take it as told, before pain becomes severe. Also, ask your healthcare provider or pharmacist about other ways to control pain. This might be with heat, ice, or relaxation. And follow any other instructions your surgeon or nurse gives you.  Tips for taking pain medicine  To get the best relief possible, remember these points:  · Pain medicines can upset your stomach. Taking them with a little food may help.  · Most pain relievers taken by mouth need at least 20 to 30 minutes to start to work.  · Taking medicine on a schedule can help you remember to take it. Try to time your medicine so that you can take it before starting an activity. This might be before you get dressed, go for a walk, or sit down for dinner.  · Constipation is a common side effect of pain medicines. Call your healthcare provider before taking any medicines such as laxatives or stool softeners to help ease constipation. Also ask if you should skip any foods. Drinking lots of fluids and eating foods such as fruits and vegetables that are high in fiber can also help. Remember, do not take laxatives unless your surgeon has prescribed them.  · Drinking alcohol and taking pain medicine can cause dizziness and slow your breathing. It can even be deadly. Do not drink alcohol while taking pain medicine.  · Pain medicine can make you react more slowly to things. Do not drive or run machinery while taking pain medicine.  Your healthcare provider may tell you to take acetaminophen to help ease your pain. Ask him or her how much you are supposed to take each day. Acetaminophen or other pain relievers may interact with your prescription medicines or other over-the-counter (OTC) medicines. Some prescription medicines have acetaminophen and other ingredients. Using both prescription and OTC acetaminophen for pain can cause you to overdose. Read the labels on your OTC medicines with care. This will help you to clearly know the  list of ingredients, how much to take, and any warnings. It may also help you not take too much acetaminophen. If you have questions or do not understand the information, ask your pharmacist or healthcare provider to explain it to you before you take the OTC medicine.  Managing nausea  Some people have an upset stomach after surgery. This is often because of anesthesia, pain, or pain medicine, or the stress of surgery. These tips will help you handle nausea and eat healthy foods as you get better. If you were on a special food plan before surgery, ask your healthcare provider if you should follow it while you get better. These tips may help:  · Do not push yourself to eat. Your body will tell you when to eat and how much.  · Start off with clear liquids and soup. They are easier to digest.  · Next try semi-solid foods, such as mashed potatoes, applesauce, and gelatin, as you feel ready.  · Slowly move to solid foods. Dont eat fatty, rich, or spicy foods at first.  · Do not force yourself to have 3 large meals a day. Instead eat smaller amounts more often.  · Take pain medicines with a small amount of solid food, such as crackers or toast, to avoid nausea.     Call your surgeon if  · You still have pain an hour after taking medicine. The medicine may not be strong enough.  · You feel too sleepy, dizzy, or groggy. The medicine may be too strong.  · You have side effects like nausea, vomiting, or skin changes, such as rash, itching, or hives.       If you have obstructive sleep apnea  You were given anesthesia medicine during surgery to keep you comfortable and free of pain. After surgery, you may have more apnea spells because of this medicine and other medicines you were given. The spells may last longer than usual.   At home:  · Keep using the continuous positive airway pressure (CPAP) device when you sleep. Unless your healthcare provider tells you not to, use it when you sleep, day or night. CPAP is a common  device used to treat obstructive sleep apnea.  · Talk with your provider before taking any pain medicine, muscle relaxants, or sedatives. Your provider will tell you about the possible dangers of taking these medicines.  Date Last Reviewed: 12/1/2016  © 7718-0114 [employer]. [address] [place] [id number]. All rights reserved. This information is not intended as a substitute for professional medical care. Always follow your healthcare professional's instructions.

## 2017-09-12 ENCOUNTER — TELEPHONE (OUTPATIENT)
Dept: PHYSICAL MEDICINE AND REHAB | Facility: CLINIC | Age: 62
End: 2017-09-12

## 2017-09-12 NOTE — TELEPHONE ENCOUNTER
Patient states the pain was intense the day of and the morning after until about 11 am . Then it started feeling better. Now she says the pain is only on the right side of her knee. It is better slightly but it is still there. 7/10 the pain never got below 7/10

## 2017-09-14 ENCOUNTER — TELEPHONE (OUTPATIENT)
Dept: PHYSICAL MEDICINE AND REHAB | Facility: CLINIC | Age: 62
End: 2017-09-14

## 2017-11-29 PROBLEM — M70.62 GREATER TROCHANTERIC BURSITIS OF BOTH HIPS: Status: ACTIVE | Noted: 2017-02-22

## 2017-11-29 PROBLEM — M79.672 LEFT FOOT PAIN: Status: ACTIVE | Noted: 2017-11-29

## 2018-01-10 PROBLEM — J34.3 HYPERTROPHY OF BOTH INFERIOR NASAL TURBINATES: Status: ACTIVE | Noted: 2018-01-10

## 2018-01-10 PROBLEM — J34.89 NASAL OBSTRUCTION: Status: ACTIVE | Noted: 2018-01-10

## 2018-01-10 PROBLEM — J01.00 SUBACUTE MAXILLARY SINUSITIS: Status: ACTIVE | Noted: 2018-01-10

## 2018-03-28 ENCOUNTER — TELEPHONE (OUTPATIENT)
Dept: PAIN MEDICINE | Facility: CLINIC | Age: 63
End: 2018-03-28

## 2018-03-28 NOTE — TELEPHONE ENCOUNTER
----- Message from Itzel Stout sent at 3/28/2018 10:45 AM CDT -----  Patient has questions prior to scheduling a consult contact patient at [phone].    Thank you

## 2018-04-16 PROBLEM — J01.00 SUBACUTE MAXILLARY SINUSITIS: Status: RESOLVED | Noted: 2018-01-10 | Resolved: 2018-04-16

## 2018-05-08 ENCOUNTER — OFFICE VISIT (OUTPATIENT)
Dept: PHYSICAL MEDICINE AND REHAB | Facility: CLINIC | Age: 63
End: 2018-05-08
Payer: MEDICAID

## 2018-05-08 VITALS
HEART RATE: 113 BPM | HEIGHT: 61 IN | BODY MASS INDEX: 29.45 KG/M2 | SYSTOLIC BLOOD PRESSURE: 126 MMHG | WEIGHT: 156 LBS | DIASTOLIC BLOOD PRESSURE: 71 MMHG

## 2018-05-08 DIAGNOSIS — M17.11 PRIMARY OSTEOARTHRITIS OF RIGHT KNEE: ICD-10-CM

## 2018-05-08 DIAGNOSIS — M76.31 IT BAND SYNDROME, RIGHT: ICD-10-CM

## 2018-05-08 DIAGNOSIS — M25.561 CHRONIC PAIN OF RIGHT KNEE: Primary | ICD-10-CM

## 2018-05-08 DIAGNOSIS — G89.29 CHRONIC PAIN OF RIGHT KNEE: Primary | ICD-10-CM

## 2018-05-08 PROCEDURE — 20551 NJX 1 TENDON ORIGIN/INSJ: CPT | Mod: S$PBB,RT,, | Performed by: PHYSICAL MEDICINE & REHABILITATION

## 2018-05-08 PROCEDURE — 76942 ECHO GUIDE FOR BIOPSY: CPT | Mod: PBBFAC,PN | Performed by: PHYSICAL MEDICINE & REHABILITATION

## 2018-05-08 PROCEDURE — 99213 OFFICE O/P EST LOW 20 MIN: CPT | Mod: 25,S$PBB,, | Performed by: PHYSICAL MEDICINE & REHABILITATION

## 2018-05-08 PROCEDURE — 99213 OFFICE O/P EST LOW 20 MIN: CPT | Mod: PBBFAC,PN,25 | Performed by: PHYSICAL MEDICINE & REHABILITATION

## 2018-05-08 PROCEDURE — 99999 PR PBB SHADOW E&M-EST. PATIENT-LVL III: CPT | Mod: PBBFAC,,, | Performed by: PHYSICAL MEDICINE & REHABILITATION

## 2018-05-08 RX ORDER — TRIAMCINOLONE ACETONIDE 40 MG/ML
40 INJECTION, SUSPENSION INTRA-ARTICULAR; INTRAMUSCULAR
Status: DISCONTINUED | OUTPATIENT
Start: 2018-05-08 | End: 2018-05-08 | Stop reason: HOSPADM

## 2018-05-08 RX ADMIN — TRIAMCINOLONE ACETONIDE 40 MG: 40 INJECTION, SUSPENSION INTRA-ARTICULAR; INTRAMUSCULAR at 01:05

## 2018-05-08 NOTE — PROCEDURES
Tendon Origin  Date/Time: 5/8/2018 1:56 PM  Performed by: REMI DENIS  Authorized by: REMI DENIS     Consent Done?:  Yes (Verbal)  Timeout: prior to procedure the correct patient, procedure, and site was verified    Indications:  Pain  Site marked: the procedure site was marked    Timeout: prior to procedure the correct patient, procedure, and site was verified    Location: Right distal IT band.  Prep: patient was prepped and draped in usual sterile fashion    Ultrasonic Guidance for Needle Placement?: Yes    Needle size:  25 G  Approach: Needled in plane, proximal to distal.  Medications:  40 mg triamcinolone acetonide 40 mg/mL  Patient tolerance:  Patient tolerated the procedure well with no immediate complications   Ultrasound guidance was used for correct needle placement, the images were saved will be uploaded to EMR.

## 2018-05-08 NOTE — PROGRESS NOTES
[facility]    CHIEF COMPLAINT:   Chief Complaint   Patient presents with    Knee Pain     right knee pain     HISTORY OF PRESENT ILLNESS: Irish Felder is a 62 y.o. female who presents to me in follow-up in regards to her right knee pain.  At last saw her in September 2017 where we performed a diagnostic genicular nerve block.  She reports the following the procedure she had a few days of increased pain, followed by several months of significant pain reduction.  She reports that only over recent weeks has she began to experience some intermittent lateral right knee pain.  She rates her pain as a 5 on a scale of 1-10.  She denies any swelling of the knee, or redness/warmth.  She notes increased pain when crossing her legs.    Review of Systems   Constitutional: Negative for fever.   HENT: Negative for drooling.    Eyes: Negative for discharge.   Respiratory: Negative for choking.    Cardiovascular: Negative for chest pain.   Genitourinary: Negative for flank pain.   Skin: Negative for wound.   Allergic/Immunologic: Negative for immunocompromised state.   Neurological: Negative for tremors and syncope.   Psychiatric/Behavioral: Negative for behavioral problems.     Past Medical History:   Past Medical History:   Diagnosis Date    Anemia     Cancer     skin cancer - nose    Chest pain 2016    GERD (gastroesophageal reflux disease)     Hyperlipidemia     Seasonal allergies        Past Surgical History:   Past Surgical History:   Procedure Laterality Date    BACK SURGERY  09/2017    Cyst removed between L4-L5, Dr. Mercedes    BREAST SURGERY Bilateral     2010    CATARACT EXTRACTION EXTRACAPSULAR W/ INTRAOCULAR LENS IMPLANTATION Bilateral 2012    COLONOSCOPY      EYE SURGERY Right 2014    retina surgery    HYSTERECTOMY  1984    partial    LASIK Bilateral 2004    [facility]    TENDON RELEASE Right 2009    right heel    TONSILLECTOMY      TOTAL REDUCTION MAMMOPLASTY Bilateral     1983    TUMOR  EXCISION Left     Below shoulder    UPPER GASTROINTESTINAL ENDOSCOPY         Family History:   Family History   Problem Relation Age of Onset    Cataracts Mother     Cancer Mother         lung    Diabetes Father     Cancer Father         lung    Cataracts Sister        Medications:   Current Outpatient Prescriptions on File Prior to Visit   Medication Sig Dispense Refill    aspirin (ECOTRIN) 81 MG EC tablet Take 81 mg by mouth once daily.      betamethasone dipropionate (DIPROLENE) 0.05 % lotion       calcium carbonate (CALCIUM CARBONATE) 300 mg (750 mg) Chew Take by mouth as needed.        diclofenac sodium (VOLTAREN) 1 % Gel Apply 2 g topically 4 (four) times daily. 4 Tube 1    furosemide (LASIX) 20 MG tablet TAKE 1 TABLET PO QD PRN  3    multivitamin capsule Take 1 capsule by mouth once daily.        nitroGLYCERIN (NITROSTAT) 0.4 MG SL tablet TK ONE T PO PRN  6    pantoprazole (PROTONIX) 40 MG tablet TK 1 T PO QD PRN  3    potassium chloride (MICRO-K) 10 MEQ CpSR TAKE 1 CAPSULE PO QD PRN WITH FOOD  3    rosuvastatin (CRESTOR) 40 MG Tab TK 1 T PO QD  2    traMADol (ULTRAM) 50 mg tablet Take 50 mg by mouth every 8 (eight) hours as needed.       trazodone (DESYREL) 50 MG tablet Take 50 mg by mouth every evening. Takes 1/2 tablet HS       No current facility-administered medications on file prior to visit.        Allergies: Review of patient's allergies indicates:  No Known Allergies    Social History:   Social History     Social History    Marital status:      Spouse name: N/A    Number of children: N/A    Years of education: N/A     Social History Main Topics    Smoking status: Never Smoker    Smokeless tobacco: Never Used    Alcohol use Yes      Comment: socially     Drug use: No    Sexual activity: Not Asked     Other Topics Concern    None     Social History Narrative    None     PHYSICAL EXAMINATION:   General    Vitals:    05/08/18 1303   BP: 126/71   Pulse: (!) 113  "  Weight: 70.8 kg (156 lb)   Height: 5' 1" (1.549 m)     Constitutional: Oriented to person, place, and time. No apparent distress. Appears well-developed and well-nourished. Pleasant.  HENT:   Head: Normocephalic and atraumatic.   Eyes: Right eye exhibits no discharge. Left eye exhibits no discharge. No scleral icterus.   Pulmonary/Chest: Effort normal. No respiratory distress.   Abdominal: There is no guarding.   Neurological: Alert and oriented to person, place, and time.   Psychiatric: Behavior is normal.   Right Knee Exam     Tenderness   The patient is experiencing tenderness in the lateral joint line (Distal IT band).    Range of Motion   Extension: 0   Flexion: 140 (There is some lateral knee pain with terminal flexion)     Tests   Florence:  Medial - negative Lateral - positive  Lachman:  Anterior - negative    Posterior - negative  Varus: negative  Valgus: negative  Patellar Apprehension: negative    Other   Erythema: absent  Scars: absent  Sensation: normal  Pulse: present  Swelling: mild  Other tests: no effusion present      Left Knee Exam   Left knee exam is normal.    Tenderness   The patient is experiencing no tenderness.         Range of Motion   Extension: normal   Flexion: normal     Muscle Strength     The patient has normal left knee strength.    Other   Erythema: absent  Scars: absent  Sensation: normal  Pulse: present  Swelling: none  Effusion: no effusion present        INSPECTION: There is no ecchymoses, erythema or gross deformity about the right knee.  GAIT/DYNAMIC: Her gait is normal.    Imaging  MRI of the right knee from 5/1/2017: 1. Flap tear reaching the inferior articular surface of the posterior horn of the lateral meniscus is suspected with free edge truncation suggestive of a radial component to the tear.  2. Flap tear reaching the inferior articular surface of the posterior horn and posterior medial corner of the medial meniscus is suspected.  3. Mild increased signal about the " MCL may be related to reactive changes from the medial compartment pathology in the absence of history or clinical findings suggestive of MCL injury. If there is evidence of MCL injury, low grade MCL strain may be considered.  4. Mild quadriceps tendinosis.  5. Tricompartmental arthritic changes are seen, most pronounced in the patellofemoral compartment.  6. Moderate joint effusion. Baker's cyst. Additional findings and details as above.    X-ray of the right knee from 2/22/2017: Mild narrowing of the medial compartment, mild periarticular subchondral sclerosis both medial and lateral compartment bilaterally, right knee patellofemoral joint moderate joint space narrowing and possible degenerative subchondral cystic change at the medial facet.    Data Reviewed: X-ray, MRI    Supportive Actions: Independent visualization of images or test specimens    ASSESSMENT:   1. Chronic pain of right knee    2. It band syndrome, right    3. Primary osteoarthritis of right knee      PLAN:     1.  We discussed that her response to the previous genicular diagnostic block was atypical in that she experienced worsening of pain acutely followed by long-term pain reduction.  Her current symptoms and exam suggest pain generation from the distal right IT band.  We discussed conservative options for this condition in we elected to proceed with ultrasound-guided peritendinous injection of corticosteroid to the distal IT band as both a diagnostic and therapeutic measure.  See separate procedure note.    2.  We will plan to contact her by phone in about 2 weeks to assess her response.  If she does not get significant relief we may have her return for intra-articular injections.  If she does get relief we may consider starting formal physical therapy to address her IT band syndrome.    The above note was completed, in part, with the aid of Dragon dictation software/hardware. Translation errors may be present.

## 2018-05-22 ENCOUNTER — TELEPHONE (OUTPATIENT)
Dept: PHYSICAL MEDICINE AND REHAB | Facility: CLINIC | Age: 63
End: 2018-05-22

## 2018-05-22 NOTE — TELEPHONE ENCOUNTER
Patient reports she is still having pain in the knee, we set up appt to do intra articular injections

## 2018-05-22 NOTE — TELEPHONE ENCOUNTER
----- Message from Adria Anguiano MD sent at 5/22/2018  8:13 AM CDT -----  Give her a call to see how she did from the shot. If she did not get significant relief we may have her return for intra-articular injections.  If she did get relief we we should start formal physical therapy to address her IT band syndrome.

## 2018-05-31 ENCOUNTER — TELEPHONE (OUTPATIENT)
Dept: PHYSICAL MEDICINE AND REHAB | Facility: CLINIC | Age: 63
End: 2018-05-31

## 2018-05-31 ENCOUNTER — OFFICE VISIT (OUTPATIENT)
Dept: PHYSICAL MEDICINE AND REHAB | Facility: CLINIC | Age: 63
End: 2018-05-31
Payer: MEDICAID

## 2018-05-31 VITALS
SYSTOLIC BLOOD PRESSURE: 136 MMHG | WEIGHT: 156 LBS | DIASTOLIC BLOOD PRESSURE: 73 MMHG | HEIGHT: 61 IN | HEART RATE: 113 BPM | BODY MASS INDEX: 29.45 KG/M2

## 2018-05-31 DIAGNOSIS — G89.29 CHRONIC LOW BACK PAIN WITHOUT SCIATICA, UNSPECIFIED BACK PAIN LATERALITY: ICD-10-CM

## 2018-05-31 DIAGNOSIS — M54.50 CHRONIC LOW BACK PAIN WITHOUT SCIATICA, UNSPECIFIED BACK PAIN LATERALITY: ICD-10-CM

## 2018-05-31 DIAGNOSIS — M23.341: ICD-10-CM

## 2018-05-31 DIAGNOSIS — M25.569 CHRONIC KNEE PAIN, UNSPECIFIED LATERALITY: Primary | ICD-10-CM

## 2018-05-31 DIAGNOSIS — M76.31 IT BAND SYNDROME, RIGHT: ICD-10-CM

## 2018-05-31 DIAGNOSIS — G89.29 CHRONIC KNEE PAIN, UNSPECIFIED LATERALITY: Primary | ICD-10-CM

## 2018-05-31 PROCEDURE — 99213 OFFICE O/P EST LOW 20 MIN: CPT | Mod: PBBFAC,PN,25 | Performed by: PHYSICAL MEDICINE & REHABILITATION

## 2018-05-31 PROCEDURE — 20611 DRAIN/INJ JOINT/BURSA W/US: CPT | Mod: PBBFAC,PN | Performed by: PHYSICAL MEDICINE & REHABILITATION

## 2018-05-31 PROCEDURE — 99999 PR PBB SHADOW E&M-EST. PATIENT-LVL III: CPT | Mod: PBBFAC,,, | Performed by: PHYSICAL MEDICINE & REHABILITATION

## 2018-05-31 PROCEDURE — 99212 OFFICE O/P EST SF 10 MIN: CPT | Mod: 25,S$PBB,, | Performed by: PHYSICAL MEDICINE & REHABILITATION

## 2018-05-31 RX ORDER — TRIAMCINOLONE ACETONIDE 40 MG/ML
40 INJECTION, SUSPENSION INTRA-ARTICULAR; INTRAMUSCULAR
Status: DISCONTINUED | OUTPATIENT
Start: 2018-05-31 | End: 2018-05-31 | Stop reason: HOSPADM

## 2018-05-31 RX ORDER — SULFAMETHOXAZOLE AND TRIMETHOPRIM 800; 160 MG/1; MG/1
TABLET ORAL
Refills: 6 | COMMUNITY
Start: 2018-05-15

## 2018-05-31 RX ORDER — EZETIMIBE 10 MG/1
TABLET ORAL
Refills: 0 | COMMUNITY
Start: 2018-04-02 | End: 2019-11-26

## 2018-05-31 RX ORDER — HYOSCYAMINE SULFATE 0.38 MG/1
TABLET, MULTILAYER, EXTENDED RELEASE ORAL
Refills: 1 | COMMUNITY
Start: 2018-05-22 | End: 2020-12-21

## 2018-05-31 RX ORDER — LEVOFLOXACIN 500 MG/1
TABLET, FILM COATED ORAL
Refills: 0 | COMMUNITY
Start: 2018-04-04 | End: 2019-11-26

## 2018-05-31 RX ADMIN — TRIAMCINOLONE ACETONIDE 40 MG: 40 INJECTION, SUSPENSION INTRA-ARTICULAR; INTRAMUSCULAR at 02:05

## 2018-05-31 NOTE — TELEPHONE ENCOUNTER
Does dr Navarro take medicaid? If so can you schedule this patient please or let me know if he does not   Thanks!

## 2018-05-31 NOTE — PROCEDURES
Large Joint Aspiration/Injection  Date/Time: 5/31/2018 2:14 PM  Performed by: REMI DENIS  Authorized by: REMI DENIS     Consent Done?:  Yes (Verbal)  Indications:  Pain  Procedure site marked: Yes    Timeout: Prior to procedure the correct patient, procedure, and site was verified      Location:  Knee  Site:  R knee  Prep: Patient was prepped and draped in usual sterile fashion    Ultrasonic Guidance for needle placement: Yes  Images are saved and documented.  Needle size:  25 G  Approach: needle in plane, lat to medial.  Medications:  40 mg triamcinolone acetonide 40 mg/mL  Patient tolerance:  Patient tolerated the procedure well with no immediate complications    Additional Comments: Ultrasound guidance was used for correct needle placement, the images were saved will be uploaded to EMR.

## 2018-05-31 NOTE — TELEPHONE ENCOUNTER
Spoke with pt and she explained [place] and other [place] so she wouldn't have to drive as far. I explained to the pt that Dr. Navarro is usually the first stop for pts to be evaluated and then he refers to PT or to another MD based on why the pt is being seen. I let her that her insurance would not cover the injections at [facility] that she's been receiving from hospitals. I did suggest she check into possibly getting some type of medicare insurance to see if she could have that as a primary based on her age. She thanked me for taking time explaining the information to her and for the call.

## 2018-07-24 ENCOUNTER — TELEPHONE (OUTPATIENT)
Dept: PHYSICAL MEDICINE AND REHAB | Facility: CLINIC | Age: 63
End: 2018-07-24
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
## 2018-11-20 PROBLEM — M18.11 PRIMARY OSTEOARTHRITIS OF FIRST CARPOMETACARPAL JOINT OF RIGHT HAND: Status: ACTIVE | Noted: 2018-11-20

## 2019-02-04 PROBLEM — M77.11 RIGHT LATERAL EPICONDYLITIS: Status: ACTIVE | Noted: 2019-02-04

## 2019-03-22 PROBLEM — M25.512 CHRONIC LEFT SHOULDER PAIN: Status: ACTIVE | Noted: 2019-03-22

## 2019-03-22 PROBLEM — G89.29 CHRONIC LEFT SHOULDER PAIN: Status: ACTIVE | Noted: 2019-03-22

## 2019-05-28 PROBLEM — M19.049 HAND ARTHRITIS: Status: ACTIVE | Noted: 2019-05-28

## 2019-05-28 PROBLEM — M67.472 GANGLION CYST OF LEFT FOOT: Status: ACTIVE | Noted: 2019-05-28

## 2019-08-23 DIAGNOSIS — M79.672 LEFT FOOT PAIN: Primary | ICD-10-CM

## 2019-08-27 ENCOUNTER — DOCUMENTATION ONLY (OUTPATIENT)
Dept: ORTHOPEDICS | Facility: CLINIC | Age: 64
End: 2019-08-27

## 2019-08-27 ENCOUNTER — HOSPITAL ENCOUNTER (OUTPATIENT)
Dept: RADIOLOGY | Facility: HOSPITAL | Age: 64
Discharge: HOME | End: 2019-08-27
Attending: ORTHOPAEDIC SURGERY
Payer: MEDICARE

## 2019-08-27 ENCOUNTER — OFFICE VISIT (OUTPATIENT)
Dept: ORTHOPEDICS | Facility: CLINIC | Age: 64
End: 2019-08-27
Payer: MEDICARE

## 2019-08-27 VITALS
SYSTOLIC BLOOD PRESSURE: 123 MMHG | WEIGHT: 142 LBS | BODY MASS INDEX: 26.81 KG/M2 | HEART RATE: 89 BPM | HEIGHT: 61 IN | DIASTOLIC BLOOD PRESSURE: 58 MMHG

## 2019-08-27 DIAGNOSIS — M79.672 LEFT FOOT PAIN: ICD-10-CM

## 2019-08-27 DIAGNOSIS — M67.472 GANGLION CYST OF LEFT FOOT: Primary | ICD-10-CM

## 2019-08-27 PROCEDURE — 99204 PR OFFICE/OUTPT VISIT, NEW, LEVL IV, 45-59 MIN: ICD-10-PCS | Mod: S$PBB,,, | Performed by: ORTHOPAEDIC SURGERY

## 2019-08-27 PROCEDURE — 99213 OFFICE O/P EST LOW 20 MIN: CPT | Mod: PBBFAC,25,PN | Performed by: ORTHOPAEDIC SURGERY

## 2019-08-27 PROCEDURE — 99999 PR PBB SHADOW E&M-EST. PATIENT-LVL III: ICD-10-PCS | Mod: PBBFAC,,, | Performed by: ORTHOPAEDIC SURGERY

## 2019-08-27 PROCEDURE — 73630 X-RAY EXAM OF FOOT: CPT | Mod: TC,PO,LT

## 2019-08-27 PROCEDURE — 73630 XR FOOT COMPLETE 3 VIEW LEFT: ICD-10-PCS | Mod: 26,LT,, | Performed by: RADIOLOGY

## 2019-08-27 PROCEDURE — 73630 X-RAY EXAM OF FOOT: CPT | Mod: 26,LT,, | Performed by: RADIOLOGY

## 2019-08-27 PROCEDURE — 99204 OFFICE O/P NEW MOD 45 MIN: CPT | Mod: S$PBB,,, | Performed by: ORTHOPAEDIC SURGERY

## 2019-08-27 PROCEDURE — 99999 PR PBB SHADOW E&M-EST. PATIENT-LVL III: CPT | Mod: PBBFAC,,, | Performed by: ORTHOPAEDIC SURGERY

## 2019-08-27 NOTE — PROGRESS NOTES
Instructed patient on preop instructions. Patient to stop taking any blood thinning medications at least seven days prior to scheduled surgery. Patient to not take any NSAIDS at least 7 days prior to surgery and will resume when Dr. Bañuelos instructs them to as it can delay bone healing. Nothing to eat/drink after midnight the night prior to surgery.   Patient instructed on postop care. Instructions included to remain non weight bearing until instructed otherwise by Dr. Bañuelos. Post op dressing can not get wet. If it gets wet patient to call office immediately or go to [facility] ER to have it replaced. Patient to keep affected limb elevated higher than the heart at all times after surgery to decrease swelling. Further instructions given to patient on preop/postop instruction handout. No NSAID form given to patient. Patient will not require any DME needs. Patient verbalized understanding.

## 2019-08-29 NOTE — PROGRESS NOTES
"HPI: Irish Felder is a  63 y.o. female  who was referred to me by Dr. Pack and was seen in consultation today for left foot pain.  No history of injury or trauma. She had a ganglion cyst on the foot drained about 6 weeks ago and it has returned. She rates her pain as 6/10 today. She says it has been present for about 3 years.     PAST MEDICAL/SURGICAL/FAMILY/SOCIAL/ HISTORY: REVIEWED    ALLERGIES/MEDICATIONS: REVIEWED       Review of Systems:     Constitution: Negative.   HEENT: Negative.   Eyes: Negative.   Cardiovascular: Negative.   Respiratory: Negative.   Endocrine: Negative.   Hematologic/Lymphatic: Negative.   Skin: Negative.   Musculoskeletal: Positive for left foot pain   Gastrointestinal: Negative.   Genitourinary: Negative.   Neurological: Negative.   Psychiatric/Behavioral: Negative.   Allergic/Immunologic: Negative.       PHYSICAL EXAM:  Vitals:    08/27/19 1125   BP: (!) 123/58   Pulse: 89     Ht Readings from Last 1 Encounters:   08/27/19 5' 1" (1.549 m)     Wt Readings from Last 1 Encounters:   08/27/19 64.4 kg (142 lb)       GENERAL: Well developed, well nourished, no acute distress.  Very pleasant.   SKIN: Skin is intact. No atrophy, abrasions or lesions are noted.   Neurological: Normal mental status. Appropriate and conversant. Alert and oriented x 3.  GAIT: Walks with non-antalgic gait.    Left  lower extremity compared with RLE:  2+ dorsalis pedis pulse.  Capillary refill < 3 seconds.  Normal range of motion tibiotalar and subtalar joints. Normal alignment of the forefoot and the hindfoot.  5/5 strength EHL, FHL, tibialis anterior, gastrocsoleus, tibialis posterior and peroneals. Sensation to light touch intact sural, saphenous, superficial peroneal and deep peroneal nerves. Mild swelling. She has a 1.5 cm ganglion cyst on the dorsum of the foot which is tender to palpation.      XRAYS:   3 views of left foot obtained and reviewed today reveal No evidence of fractures or dislocations. " Mild  degenerative changes.       ASSESSMENT:      Left foot ganglion cyst     PLAN:   I spent 25 minutes in consulation with the patient today. More than half the time was spent counseling the patient on her condition and the options for operative versus non-operative care.  We discussed surgical tx for the patient's condition at length and in detail including post-operative course, complications, outcomes and prognosis using diagrams and models where appropriate. Informed consent was obtained, patient understands risks and benefits of procedure.  To OR for left foot ganglion cyst excision.

## 2019-08-30 RX ORDER — SODIUM CHLORIDE 9 MG/ML
INJECTION, SOLUTION INTRAVENOUS CONTINUOUS
Status: CANCELLED | OUTPATIENT
Start: 2019-08-30

## 2019-09-05 ENCOUNTER — TELEPHONE (OUTPATIENT)
Dept: ORTHOPEDICS | Facility: CLINIC | Age: 64
End: 2019-09-05

## 2019-09-05 NOTE — TELEPHONE ENCOUNTER
----- Message from Bubba Pradhan sent at 9/5/2019  4:57 PM CDT -----  Contact: pt  Needs to cancel Sx, [phone]

## 2019-09-05 NOTE — TELEPHONE ENCOUNTER
Pt requesting to cancel surgery with Dr Bañuelos due to a sooner availability to have a procedure performed for her back.  I informed pt that we will cancel her surgery and to call back when she is ready to reschedule the surgery. Pt stated she will call back tomorrow to reschedule surgery.  Pt had no other questions or concerns.

## 2019-09-27 PROBLEM — M18.11 ARTHRITIS OF CARPOMETACARPAL (CMC) JOINT OF RIGHT THUMB: Status: ACTIVE | Noted: 2019-09-27

## 2019-10-03 ENCOUNTER — TELEPHONE (OUTPATIENT)
Dept: ORTHOPEDICS | Facility: CLINIC | Age: 64
End: 2019-10-03

## 2019-10-03 NOTE — TELEPHONE ENCOUNTER
----- Message from Zandra Bernard sent at 10/2/2019  4:11 PM CDT -----  Please schedule with Dr. Sunil Morgan.

## 2019-10-03 NOTE — TELEPHONE ENCOUNTER
Pt scheduled to see Dr Morgan on  11/4/19 at 1:20 PM.  Pt agreed to appointment date and time.  Pt had no questions or concerns to be addressed at this time.

## 2019-11-15 ENCOUNTER — TELEPHONE (OUTPATIENT)
Dept: ORTHOPEDICS | Facility: CLINIC | Age: 64
End: 2019-11-15

## 2019-11-15 NOTE — TELEPHONE ENCOUNTER
----- Message from Bubba Pradhan sent at 11/15/2019  3:59 PM CST -----  Contact: pt  Please call to discuss Sx, [phone]

## 2019-11-15 NOTE — TELEPHONE ENCOUNTER
Spoke to patient. Scheduled for 11/26 at 11am for an appt with Dr. Bañuelos. Patient stated understanding.

## 2019-11-26 ENCOUNTER — OFFICE VISIT (OUTPATIENT)
Dept: ORTHOPEDICS | Facility: CLINIC | Age: 64
End: 2019-11-26
Payer: MEDICARE

## 2019-11-26 VITALS
BODY MASS INDEX: 24.92 KG/M2 | DIASTOLIC BLOOD PRESSURE: 75 MMHG | HEART RATE: 97 BPM | HEIGHT: 61 IN | WEIGHT: 132 LBS | SYSTOLIC BLOOD PRESSURE: 110 MMHG

## 2019-11-26 DIAGNOSIS — M67.472 GANGLION CYST OF LEFT FOOT: Primary | ICD-10-CM

## 2019-11-26 DIAGNOSIS — M54.2 NECK PAIN: Primary | ICD-10-CM

## 2019-11-26 PROCEDURE — 1101F PR PT FALLS ASSESS DOC 0-1 FALLS W/OUT INJ PAST YR: ICD-10-PCS | Mod: CPTII,S$GLB,, | Performed by: ORTHOPAEDIC SURGERY

## 2019-11-26 PROCEDURE — 3008F PR BODY MASS INDEX (BMI) DOCUMENTED: ICD-10-PCS | Mod: CPTII,S$GLB,, | Performed by: ORTHOPAEDIC SURGERY

## 2019-11-26 PROCEDURE — 99214 OFFICE O/P EST MOD 30 MIN: CPT | Mod: S$GLB,,, | Performed by: ORTHOPAEDIC SURGERY

## 2019-11-26 PROCEDURE — 99999 PR PBB SHADOW E&M-EST. PATIENT-LVL III: ICD-10-PCS | Mod: PBBFAC,,, | Performed by: ORTHOPAEDIC SURGERY

## 2019-11-26 PROCEDURE — 3008F BODY MASS INDEX DOCD: CPT | Mod: CPTII,S$GLB,, | Performed by: ORTHOPAEDIC SURGERY

## 2019-11-26 PROCEDURE — 99214 PR OFFICE/OUTPT VISIT, EST, LEVL IV, 30-39 MIN: ICD-10-PCS | Mod: S$GLB,,, | Performed by: ORTHOPAEDIC SURGERY

## 2019-11-26 PROCEDURE — 99999 PR PBB SHADOW E&M-EST. PATIENT-LVL III: CPT | Mod: PBBFAC,,, | Performed by: ORTHOPAEDIC SURGERY

## 2019-11-26 PROCEDURE — 1101F PT FALLS ASSESS-DOCD LE1/YR: CPT | Mod: CPTII,S$GLB,, | Performed by: ORTHOPAEDIC SURGERY

## 2019-11-26 NOTE — PROGRESS NOTES
"HPI: Irish Felder is a  64 y.o. female who was seen for f/u today for left foot pain.  She had to cancel her surgery due to sinus infection. She rates her pain as 8/10 today. She says it has been present for about 3 years. She is here to reschedule surgery.     PAST MEDICAL/SURGICAL/FAMILY/SOCIAL/ HISTORY: REVIEWED    ALLERGIES/MEDICATIONS: REVIEWED       Review of Systems:     Constitution: Negative.   HEENT: Negative.   Eyes: Negative.   Cardiovascular: Negative.   Respiratory: Negative.   Endocrine: Negative.   Hematologic/Lymphatic: Negative.   Skin: Negative.   Musculoskeletal: Positive for left foot pain   Gastrointestinal: Negative.   Genitourinary: Negative.   Neurological: Negative.   Psychiatric/Behavioral: Negative.   Allergic/Immunologic: Negative.       PHYSICAL EXAM:  Vitals:    11/26/19 1102   BP: 110/75   Pulse: 97     Ht Readings from Last 1 Encounters:   11/26/19 5' 1" (1.549 m)     Wt Readings from Last 1 Encounters:   11/26/19 59.9 kg (132 lb)       GENERAL: Well developed, well nourished, no acute distress.  Very pleasant.   SKIN: Skin is intact. No atrophy, abrasions or lesions are noted.   Neurological: Normal mental status. Appropriate and conversant. Alert and oriented x 3.  GAIT: Walks with non-antalgic gait.    Left  lower extremity compared with RLE:  2+ dorsalis pedis pulse.  Capillary refill < 3 seconds.  Normal range of motion tibiotalar and subtalar joints. Normal alignment of the forefoot and the hindfoot.  5/5 strength EHL, FHL, tibialis anterior, gastrocsoleus, tibialis posterior and peroneals. Sensation to light touch intact sural, saphenous, superficial peroneal and deep peroneal nerves. Mild swelling. She has a 1.5 cm ganglion cyst on the dorsum of the foot which is tender to palpation.      XRAYS:   3 views of left foot obtained and reviewed today reveal No evidence of fractures or dislocations. Mild degenerative changes.       ASSESSMENT:      Left foot ganglion cyst "     PLAN:   I spent 25 minutes in consulation with the patient today. More than half the time was spent counseling the patient on her condition and the options for operative versus non-operative care.  We discussed surgical tx for the patient's condition at length and in detail including post-operative course, complications, outcomes and prognosis using diagrams and models where appropriate. Informed consent was obtained, patient understands risks and benefits of procedure.  To OR for left foot ganglion cyst excision.   I also referred her to Dr. Manning for chronic lumbar and cervical degenerative disc disease.

## 2019-11-27 ENCOUNTER — TELEPHONE (OUTPATIENT)
Dept: NEUROSURGERY | Facility: CLINIC | Age: 64
End: 2019-11-27

## 2019-11-27 RX ORDER — SODIUM CHLORIDE 9 MG/ML
INJECTION, SOLUTION INTRAVENOUS CONTINUOUS
Status: CANCELLED | OUTPATIENT
Start: 2019-11-27

## 2019-12-06 ENCOUNTER — ANESTHESIA EVENT (OUTPATIENT)
Dept: SURGERY | Facility: HOSPITAL | Age: 64
End: 2019-12-06
Payer: MEDICARE

## 2019-12-09 ENCOUNTER — HOSPITAL ENCOUNTER (OUTPATIENT)
Facility: HOSPITAL | Age: 64
Discharge: HOME | End: 2019-12-09
Attending: ORTHOPAEDIC SURGERY | Admitting: ORTHOPAEDIC SURGERY
Payer: MEDICARE

## 2019-12-09 ENCOUNTER — ANESTHESIA (OUTPATIENT)
Dept: SURGERY | Facility: HOSPITAL | Age: 64
End: 2019-12-09
Payer: MEDICARE

## 2019-12-09 DIAGNOSIS — M67.472 GANGLION CYST OF LEFT FOOT: Primary | ICD-10-CM

## 2019-12-09 PROCEDURE — 88304 TISSUE EXAM BY PATHOLOGIST: CPT | Performed by: PATHOLOGY

## 2019-12-09 PROCEDURE — D9220A PRA ANESTHESIA: ICD-10-PCS | Mod: ANES,,, | Performed by: ANESTHESIOLOGY

## 2019-12-09 PROCEDURE — D9220A PRA ANESTHESIA: ICD-10-PCS | Mod: CRNA,,, | Performed by: NURSE ANESTHETIST, CERTIFIED REGISTERED

## 2019-12-09 PROCEDURE — 25000003 PHARM REV CODE 250: Mod: PO | Performed by: ANESTHESIOLOGY

## 2019-12-09 PROCEDURE — 71000033 HC RECOVERY, INTIAL HOUR: Mod: PO | Performed by: ORTHOPAEDIC SURGERY

## 2019-12-09 PROCEDURE — 01470 ANES PX NRV MSC LW L/A/F NOS: CPT | Mod: PO | Performed by: ORTHOPAEDIC SURGERY

## 2019-12-09 PROCEDURE — 63600175 PHARM REV CODE 636 W HCPCS: Mod: PO | Performed by: ANESTHESIOLOGY

## 2019-12-09 PROCEDURE — 88304 PR  SURG PATH,LEVEL III: ICD-10-PCS | Mod: 26,,, | Performed by: PATHOLOGY

## 2019-12-09 PROCEDURE — 63600175 PHARM REV CODE 636 W HCPCS: Mod: PO | Performed by: ORTHOPAEDIC SURGERY

## 2019-12-09 PROCEDURE — 71000015 HC POSTOP RECOV 1ST HR: Mod: PO | Performed by: ORTHOPAEDIC SURGERY

## 2019-12-09 PROCEDURE — D9220A PRA ANESTHESIA: Mod: CRNA,,, | Performed by: NURSE ANESTHETIST, CERTIFIED REGISTERED

## 2019-12-09 PROCEDURE — 37000009 HC ANESTHESIA EA ADD 15 MINS: Mod: PO | Performed by: ORTHOPAEDIC SURGERY

## 2019-12-09 PROCEDURE — 88304 TISSUE EXAM BY PATHOLOGIST: CPT | Mod: 26,,, | Performed by: PATHOLOGY

## 2019-12-09 PROCEDURE — 63600175 PHARM REV CODE 636 W HCPCS: Mod: PO | Performed by: NURSE ANESTHETIST, CERTIFIED REGISTERED

## 2019-12-09 PROCEDURE — 37000008 HC ANESTHESIA 1ST 15 MINUTES: Mod: PO | Performed by: ORTHOPAEDIC SURGERY

## 2019-12-09 PROCEDURE — 28090 REMOVAL OF FOOT LESION: CPT | Mod: LT,,, | Performed by: ORTHOPAEDIC SURGERY

## 2019-12-09 PROCEDURE — 25000003 PHARM REV CODE 250: Mod: PO | Performed by: ORTHOPAEDIC SURGERY

## 2019-12-09 PROCEDURE — D9220A PRA ANESTHESIA: Mod: ANES,,, | Performed by: ANESTHESIOLOGY

## 2019-12-09 PROCEDURE — 36000707: Mod: PO | Performed by: ORTHOPAEDIC SURGERY

## 2019-12-09 PROCEDURE — 36000706: Mod: PO | Performed by: ORTHOPAEDIC SURGERY

## 2019-12-09 PROCEDURE — S0020 INJECTION, BUPIVICAINE HYDRO: HCPCS | Mod: PO | Performed by: ORTHOPAEDIC SURGERY

## 2019-12-09 PROCEDURE — 25000003 PHARM REV CODE 250: Mod: PO | Performed by: NURSE ANESTHETIST, CERTIFIED REGISTERED

## 2019-12-09 PROCEDURE — 28090 PR EXCIS TENDN/CAPSULE LESN,FOOT: ICD-10-PCS | Mod: LT,,, | Performed by: ORTHOPAEDIC SURGERY

## 2019-12-09 RX ORDER — FENTANYL CITRATE 50 UG/ML
INJECTION, SOLUTION INTRAMUSCULAR; INTRAVENOUS
Status: DISCONTINUED | OUTPATIENT
Start: 2019-12-09 | End: 2019-12-09

## 2019-12-09 RX ORDER — KETAMINE HYDROCHLORIDE 100 MG/ML
INJECTION, SOLUTION INTRAMUSCULAR; INTRAVENOUS
Status: DISCONTINUED | OUTPATIENT
Start: 2019-12-09 | End: 2019-12-09

## 2019-12-09 RX ORDER — LIDOCAINE HYDROCHLORIDE 20 MG/ML
INJECTION, SOLUTION EPIDURAL; INFILTRATION; INTRACAUDAL; PERINEURAL
Status: DISCONTINUED | OUTPATIENT
Start: 2019-12-09 | End: 2019-12-09 | Stop reason: HOSPADM

## 2019-12-09 RX ORDER — LIDOCAINE HCL/PF 100 MG/5ML
SYRINGE (ML) INTRAVENOUS
Status: DISCONTINUED | OUTPATIENT
Start: 2019-12-09 | End: 2019-12-09

## 2019-12-09 RX ORDER — BUPIVACAINE HYDROCHLORIDE 5 MG/ML
INJECTION, SOLUTION EPIDURAL; INTRACAUDAL
Status: DISCONTINUED | OUTPATIENT
Start: 2019-12-09 | End: 2019-12-09 | Stop reason: HOSPADM

## 2019-12-09 RX ORDER — ONDANSETRON 2 MG/ML
INJECTION INTRAMUSCULAR; INTRAVENOUS
Status: DISCONTINUED | OUTPATIENT
Start: 2019-12-09 | End: 2019-12-09

## 2019-12-09 RX ORDER — DEXAMETHASONE SODIUM PHOSPHATE 4 MG/ML
8 INJECTION, SOLUTION INTRA-ARTICULAR; INTRALESIONAL; INTRAMUSCULAR; INTRAVENOUS; SOFT TISSUE
Status: COMPLETED | OUTPATIENT
Start: 2019-12-09 | End: 2019-12-09

## 2019-12-09 RX ORDER — ONDANSETRON 4 MG/1
8 TABLET, ORALLY DISINTEGRATING ORAL EVERY 8 HOURS PRN
Qty: 6 TABLET | Refills: 1 | Status: SHIPPED | OUTPATIENT
Start: 2019-12-09 | End: 2021-02-03

## 2019-12-09 RX ORDER — MIDAZOLAM HYDROCHLORIDE 1 MG/ML
INJECTION, SOLUTION INTRAMUSCULAR; INTRAVENOUS
Status: DISCONTINUED | OUTPATIENT
Start: 2019-12-09 | End: 2019-12-09

## 2019-12-09 RX ORDER — FENTANYL CITRATE 50 UG/ML
25 INJECTION, SOLUTION INTRAMUSCULAR; INTRAVENOUS EVERY 5 MIN PRN
Status: DISCONTINUED | OUTPATIENT
Start: 2019-12-09 | End: 2019-12-09 | Stop reason: HOSPADM

## 2019-12-09 RX ORDER — SODIUM CHLORIDE 9 MG/ML
INJECTION, SOLUTION INTRAVENOUS CONTINUOUS
Status: DISCONTINUED | OUTPATIENT
Start: 2019-12-09 | End: 2019-12-09 | Stop reason: HOSPADM

## 2019-12-09 RX ORDER — PROPOFOL 10 MG/ML
VIAL (ML) INTRAVENOUS
Status: DISCONTINUED | OUTPATIENT
Start: 2019-12-09 | End: 2019-12-09

## 2019-12-09 RX ORDER — OXYCODONE HYDROCHLORIDE 5 MG/1
5 TABLET ORAL
Status: DISCONTINUED | OUTPATIENT
Start: 2019-12-09 | End: 2019-12-09 | Stop reason: HOSPADM

## 2019-12-09 RX ORDER — SODIUM CHLORIDE 0.9 G/100ML
IRRIGANT IRRIGATION
Status: DISCONTINUED | OUTPATIENT
Start: 2019-12-09 | End: 2019-12-09 | Stop reason: HOSPADM

## 2019-12-09 RX ORDER — LIDOCAINE HYDROCHLORIDE 10 MG/ML
1 INJECTION, SOLUTION EPIDURAL; INFILTRATION; INTRACAUDAL; PERINEURAL ONCE
Status: DISCONTINUED | OUTPATIENT
Start: 2019-12-09 | End: 2019-12-09 | Stop reason: HOSPADM

## 2019-12-09 RX ORDER — ACETAMINOPHEN 10 MG/ML
INJECTION, SOLUTION INTRAVENOUS
Status: DISCONTINUED | OUTPATIENT
Start: 2019-12-09 | End: 2019-12-09

## 2019-12-09 RX ORDER — CEFAZOLIN SODIUM 2 G/50ML
2 SOLUTION INTRAVENOUS
Status: COMPLETED | OUTPATIENT
Start: 2019-12-09 | End: 2019-12-09

## 2019-12-09 RX ORDER — OXYCODONE AND ACETAMINOPHEN 5; 325 MG/1; MG/1
1 TABLET ORAL EVERY 4 HOURS PRN
Qty: 22 TABLET | Refills: 0 | Status: SHIPPED | OUTPATIENT
Start: 2019-12-09 | End: 2020-10-09

## 2019-12-09 RX ORDER — SODIUM CHLORIDE, SODIUM LACTATE, POTASSIUM CHLORIDE, CALCIUM CHLORIDE 600; 310; 30; 20 MG/100ML; MG/100ML; MG/100ML; MG/100ML
INJECTION, SOLUTION INTRAVENOUS CONTINUOUS
Status: DISCONTINUED | OUTPATIENT
Start: 2019-12-09 | End: 2019-12-09 | Stop reason: HOSPADM

## 2019-12-09 RX ORDER — MUPIROCIN 20 MG/G
OINTMENT TOPICAL
Status: DISCONTINUED | OUTPATIENT
Start: 2019-12-09 | End: 2019-12-09 | Stop reason: HOSPADM

## 2019-12-09 RX ADMIN — ACETAMINOPHEN 1000 MG: 10 INJECTION, SOLUTION INTRAVENOUS at 07:12

## 2019-12-09 RX ADMIN — DEXAMETHASONE SODIUM PHOSPHATE 8 MG: 4 INJECTION, SOLUTION INTRAMUSCULAR; INTRAVENOUS at 06:12

## 2019-12-09 RX ADMIN — FENTANYL CITRATE 50 MCG: 50 INJECTION, SOLUTION INTRAMUSCULAR; INTRAVENOUS at 06:12

## 2019-12-09 RX ADMIN — FENTANYL CITRATE 50 MCG: 50 INJECTION, SOLUTION INTRAMUSCULAR; INTRAVENOUS at 07:12

## 2019-12-09 RX ADMIN — PROPOFOL 150 MG: 10 INJECTION, EMULSION INTRAVENOUS at 07:12

## 2019-12-09 RX ADMIN — MIDAZOLAM HYDROCHLORIDE 2 MG: 1 INJECTION, SOLUTION INTRAMUSCULAR; INTRAVENOUS at 06:12

## 2019-12-09 RX ADMIN — SODIUM CHLORIDE, POTASSIUM CHLORIDE, SODIUM LACTATE AND CALCIUM CHLORIDE: 600; 310; 30; 20 INJECTION, SOLUTION INTRAVENOUS at 06:12

## 2019-12-09 RX ADMIN — CEFAZOLIN SODIUM 2 G: 2 SOLUTION INTRAVENOUS at 06:12

## 2019-12-09 RX ADMIN — ONDANSETRON 4 MG: 2 INJECTION, SOLUTION INTRAMUSCULAR; INTRAVENOUS at 06:12

## 2019-12-09 RX ADMIN — LIDOCAINE HYDROCHLORIDE 75 MG: 20 INJECTION PARENTERAL at 07:12

## 2019-12-09 RX ADMIN — OXYCODONE HYDROCHLORIDE 5 MG: 5 TABLET ORAL at 08:12

## 2019-12-09 RX ADMIN — KETAMINE HYDROCHLORIDE 25 MG: 100 INJECTION, SOLUTION, CONCENTRATE INTRAMUSCULAR; INTRAVENOUS at 07:12

## 2019-12-09 RX ADMIN — FENTANYL CITRATE 100 MCG: 50 INJECTION INTRAMUSCULAR; INTRAVENOUS at 08:12

## 2019-12-09 NOTE — TRANSFER OF CARE
"Anesthesia Transfer of Care Note    Patient: Irish Felder    Procedure(s) Performed: Procedure(s) (LRB):  EXCISION, GANGLION CYST, FOOT (Left)    Patient location: PACU    Anesthesia Type: general    Transport from OR: Transported from OR on room air with adequate spontaneous ventilation    Post pain: adequate analgesia    Post assessment: no apparent anesthetic complications and tolerated procedure well    Post vital signs: stable    Level of consciousness: awake and sedated    Nausea/Vomiting: no nausea/vomiting    Complications: none    Transfer of care protocol was followed      Last vitals:   Visit Vitals  BP (!) 146/63   Pulse 88   Temp 36.6 °C (97.9 °F) (Skin)   Resp 18   Ht 5' 1" (1.549 m)   Wt 59.9 kg (132 lb 0.9 oz)   SpO2 100%   Breastfeeding? No   BMI 24.95 kg/m²     "

## 2019-12-09 NOTE — OP NOTE
OPERATIVE NOTE    DATE: 12/9/2019 TIME: 8:01 AM      PRE-OPERATIVE DIAGNOSIS: Left foot ganglion cyst      POST-OPERATIVE DIAGNOSIS: Left foot ganglion cyst      PROCEDURE: Excision of Left foot ganglion cyst      SURGEON: Shawn Bañuelos MD     ANESTHESIA TYPE: LMA     SPECIMENS: Cyst from Left foot     COMPLICATIONS: None      BLOOD LOSS: 5 cc     ASSISTANT: None    PROCEDURE IN DETAIL:  After appropriate informed consent was obtained the patient was taken to the OR and placed in the supine position. The Left lower extremity was prepped and draped in the usual sterile fashion. Esmarch tourniquet was used.  Approximately, 4-cm longitudinal incision was made using a #15 blade on the dorsal aspect of the Left foot overlying the cyst.  The dissection was carefully carried out and the cyst was exposed using Litler scissors.  It was firm and well encapsulated. The cyst was approximately 3 cm in diameter and directly adherent the neurovascular bundle.  The neurovascular bundle was identified and protected throughout the case.  The cyst was completely removed and sent for specimen.  The bed of the cyst which communicated with the joint was cauterized with the bovie.  The incision was irrigated with normal saline. The tourniquet was let down, adequate hemostasis was achieved using bovie cautery.  The subcutaneous layer was re-approximated using 3.0-monocyrl in an interrupted fashion. The skin was re-approximated using 3.0 nylon in a running fashion. Sterile dressing using xeroform, bacitracin, 4x8s, cast padding and ace wrap were placed.  Patient tolerated the procedure well without complications. I was present and scrubbed for the entire case.

## 2019-12-09 NOTE — DISCHARGE INSTRUCTIONS
FOOT SURGERY/REMOVAL OF GAGLION CYST   After surgery:    DOS:   Keep leg elevated for first 48-72 hours.   NO ICE TO FOOT OR LEG NEAR FOOT   Keep dressing clean and dry   Advanced diet as tolerated.    Check circulation frequently in toes by pressing down on toenail. Nail should turn white and then pink when released.   May walk on foot to go to the bathroom AND LIGHT WALKING AS NEEDED   Wear surgical shoe (SUDHIR)  May remove shoe to sleep.   USE THE KNEE ROLLER THAT SHOULD BE SENT TO YOUR HOUSE.   Resume home medications____TODAY.  NO IBUPROFEN, NAPROXEN OR ANY OTHER NSAIDS UNTIL CLEARED BY DR. PERALTA___    DONT:   Do not remove your dressing   Do not get dressing wet.   No driving for 48 hours or while taking narcotic pain medications   DO NOT TAKE ADDITIONAL TYLENOL/ACETAMINOPHEN WHILE TAKING NARCOTIC PAIN MEDICATION THAT CONTAINS TYLENOL/ACETAMINOPHEN.    CALL PHYSICIAN FOR:   Pain, burning, or numbness of the toes not relieved by elevation of the leg.   Pale or cold toes; bluish nail beds.   Redness, swelling, or bleeding.   Fever> 101   Drainage (pus) from the puncture sites   Pain unrelieved by pain medication  KEEP SCHEDULED APPOINTMENT  FOR [facility]ER[facility]CIES CONTACT ____KATHRYN___AT__NUMBER PROVIDED___

## 2019-12-09 NOTE — BRIEF OP NOTE
OPERATIVE NOTE    DATE: 12/9/2019 TIME: 8:01 AM      PRE-OPERATIVE DIAGNOSIS: Left foot ganglion cyst      POST-OPERATIVE DIAGNOSIS: Left foot ganglion cyst      PROCEDURE: Excision of Left foot ganglion cyst      SURGEON: Shawn Bañuelos MD     ANESTHESIA TYPE: LMA     SPECIMENS: Cyst from Left foot     COMPLICATIONS: None      BLOOD LOSS: 5 cc     ASSISTANT: none

## 2019-12-09 NOTE — H&P
"HPI: Irish Felder is a  64 y.o. female who was seen for f/u today for left foot pain.  She had to cancel her surgery due to sinus infection. She rates her pain as 8/10 today. She says it has been present for about 3 years. She is here to reschedule surgery.      PAST MEDICAL/SURGICAL/FAMILY/SOCIAL/ HISTORY: REVIEWED    ALLERGIES/MEDICATIONS: REVIEWED         Review of Systems:     Constitution: Negative.   HEENT: Negative.   Eyes: Negative.   Cardiovascular: Negative.   Respiratory: Negative.   Endocrine: Negative.   Hematologic/Lymphatic: Negative.   Skin: Negative.   Musculoskeletal: Positive for left foot pain   Gastrointestinal: Negative.   Genitourinary: Negative.   Neurological: Negative.   Psychiatric/Behavioral: Negative.   Allergic/Immunologic: Negative.         PHYSICAL EXAM:      Vitals:     11/26/19 1102   BP: 110/75   Pulse: 97          Ht Readings from Last 1 Encounters:   11/26/19 5' 1" (1.549 m)          Wt Readings from Last 1 Encounters:   11/26/19 59.9 kg (132 lb)        GENERAL: Well developed, well nourished, no acute distress.  Very pleasant.   SKIN: Skin is intact. No atrophy, abrasions or lesions are noted.   Neurological: Normal mental status. Appropriate and conversant. Alert and oriented x 3.  GAIT: Walks with non-antalgic gait.     Left  lower extremity compared with RLE:  2+ dorsalis pedis pulse.  Capillary refill < 3 seconds.  Normal range of motion tibiotalar and subtalar joints. Normal alignment of the forefoot and the hindfoot.  5/5 strength EHL, FHL, tibialis anterior, gastrocsoleus, tibialis posterior and peroneals. Sensation to light touch intact sural, saphenous, superficial peroneal and deep peroneal nerves. Mild swelling. She has a 1.5 cm ganglion cyst on the dorsum of the foot which is tender to palpation.        XRAYS:   3 views of left foot obtained and reviewed today reveal No evidence of fractures or dislocations. Mild degenerative changes.         ASSESSMENT:  "      Left foot ganglion cyst     PLAN:   I spent 25 minutes in consulation with the patient today. More than half the time was spent counseling the patient on her condition and the options for operative versus non-operative care.  We discussed surgical tx for the patient's condition at length and in detail including post-operative course, complications, outcomes and prognosis using diagrams and models where appropriate. Informed consent was obtained, patient understands risks and benefits of procedure.  To OR for left foot ganglion cyst excision.   I also referred her to Dr. Manning for chronic lumbar and cervical degenerative disc disease.

## 2019-12-09 NOTE — PLAN OF CARE
SEE MAR FOR PAIN MEDS GIVEN PER ORDER.  PT C/O PAIN LEFT FOOT 8/10 CLARIFIED AND TEACHING RE SCALE.  STICKS WITH 8/10

## 2019-12-09 NOTE — ANESTHESIA PREPROCEDURE EVALUATION
12/09/2019  Irish Felder is a 64 y.o., female.    Anesthesia Evaluation    I have reviewed the Patient Summary Reports.    I have reviewed the Nursing Notes.   I have reviewed the Medications.     Review of Systems  Cardiovascular:  Cardiovascular Normal Exercise tolerance: good     Pulmonary:  Pulmonary Normal    Renal/:  Renal/ Normal     Hepatic/GI:   GERD, well controlled    Musculoskeletal:   Arthritis     Neurological:  Neurology Normal    Endocrine:   Hypothyroidism    Psych:  Psychiatric Normal           Physical Exam  General:  Well nourished    Airway/Jaw/Neck:  Airway Findings: Mouth Opening: Normal Tongue: Normal  General Airway Assessment: Adult  Mallampati: III  Improves to II with phonation.     Eyes/Ears/Nose:  Eyes/Ears/Nose Findings:    Dental:  Dental Findings: In tact   Chest/Lungs:  Chest/Lungs Findings: Clear to auscultation, Normal Respiratory Rate         Mental Status:  Mental Status Findings:  Cooperative, Alert and Oriented         Anesthesia Plan  Type of Anesthesia, risks & benefits discussed:  Anesthesia Type:  general  Patient's Preference:   Intra-op Monitoring Plan: standard ASA monitors  Intra-op Monitoring Plan Comments:   Post Op Pain Control Plan:   Post Op Pain Control Plan Comments:   Induction:   IV and Inhalation  Beta Blocker:  Patient is not currently on a Beta-Blocker (No further documentation required).       Informed Consent: Patient understands risks and agrees with Anesthesia plan.  Questions answered. Anesthesia consent signed with patient.  ASA Score: 2     Day of Surgery Review of History & Physical:            Ready For Surgery From Anesthesia Perspective.

## 2019-12-09 NOTE — ANESTHESIA POSTPROCEDURE EVALUATION
Anesthesia Post Evaluation    Patient: Irish Felder    Procedure(s) Performed: Procedure(s) (LRB):  EXCISION, GANGLION CYST, FOOT (Left)    Final Anesthesia Type: general    Patient location during evaluation: PACU  Patient participation: Yes- Able to Participate  Level of consciousness: sedated and awake  Post-procedure vital signs: reviewed and stable  Pain management: adequate  Airway patency: patent    PONV status at discharge: No PONV  Anesthetic complications: no      Cardiovascular status: blood pressure returned to baseline  Respiratory status: spontaneous ventilation  Hydration status: euvolemic  Follow-up not needed.          Vitals Value Taken Time   /58 12/9/2019  8:29 AM   Temp 36.5 °C (97.7 °F) 12/9/2019  7:54 AM   Pulse 71 12/9/2019  8:29 AM   Resp 14 12/9/2019  8:29 AM   SpO2 92 % 12/9/2019  8:29 AM         No case tracking events are documented in the log.      Pain/Ousmane Score: Pain Rating Prior to Med Admin: 8 (12/9/2019  8:24 AM)  Ousmane Score: 9 (12/9/2019  8:29 AM)

## 2019-12-09 NOTE — DISCHARGE SUMMARY
[facility]  Discharge Note  Short Stay    Admit Date: 12/9/2019    Discharge Date and Time:  12/9/2019 8:04 AM     Attending Physician: Shawn Bañuelos MD     Discharge Provider: Shawn Bañuelos    Diagnoses:  Active Hospital Problems    Diagnosis  POA    *Ganglion cyst of left foot [M67.472]  Yes      Resolved Hospital Problems   No resolved problems to display.       Discharged Condition: good    Hospital Course: Patient was admitted for an outpatient procedure and tolerated the procedure well with no complications.    Final Diagnoses: Same as principal problem.    Disposition: Home or Self Care    Follow up/Patient Instructions:    Medications:  Reconciled Home Medications:      Medication List      START taking these medications    ondansetron 4 MG Tbdl  Commonly known as:  ZOFRAN-ODT  Take 2 tablets (8 mg total) by mouth every 8 (eight) hours as needed.     oxyCODONE-acetaminophen 5-325 mg per tablet  Commonly known as:  PERCOCET  Take 1 tablet by mouth every 4 (four) hours as needed for Pain.        CONTINUE taking these medications    [place] Thyroid 60 mg Tab  Generic drug:  thyroid (pork)  TK 1 T PO D     betamethasone dipropionate 0.05 % lotion  Commonly known as:  DIPROLENE     Calcium Antacid 300 mg (750 mg) Chew  Generic drug:  calcium carbonate  Take by mouth as needed.     diclofenac sodium 1 % Gel  Commonly known as:  Voltaren  Apply 2 g topically 4 (four) times daily.     FLUoxetine 20 MG capsule  Take 20 mg by mouth Every 3 (three) days.     multivitamin capsule  Take 1 capsule by mouth once daily.     nitroGLYCERIN 0.4 MG SL tablet  Commonly known as:  NITROSTAT  TK ONE T PO PRN     rosuvastatin 40 MG Tab  Commonly known as:  CRESTOR  TK 1 T PO QD     Symax Duotab 0.125 mg-0.25 mg (0.375 mg) Tbmp  Generic drug:  hyoscyamine sulfate  TK 1 T PO TID PRF CRAMPING     traMADol 50 mg tablet  Commonly known as:  ULTRAM  Take 50 mg by mouth every 8 (eight) hours as needed.     traZODone 50  "MG tablet  Commonly known as:  DESYREL  Take 50 mg by mouth every evening. Takes 1/2 tablet HS        STOP taking these medications    aspirin 81 MG EC tablet  Commonly known as:  ECOTRIN     vitamin E 100 UNIT capsule          Discharge Procedure Orders   CRUTCHES FOR HOME USE     Order Specific Question Answer Comments   Type: Axillary    Height: 5' 1" (1.549 m)    Weight: 59.9 kg (132 lb 0.9 oz)    Length of need (1-99 months): 2      Diet general     Call MD for:  temperature >100.4     Call MD for:  persistent nausea and vomiting     Call MD for:  severe uncontrolled pain     Call MD for:  difficulty breathing, headache or visual disturbances     Call MD for:  redness, tenderness, or signs of infection (pain, swelling, redness, odor or green/yellow discharge around incision site)     Call MD for:  hives     Call MD for:  persistent dizziness or light-headedness     Call MD for:  extreme fatigue     Keep surgical extremity elevated     No driving, operating heavy equipment or signing legal documents while taking pain medication     Leave dressing on - Keep it clean, dry, and intact until clinic visit     Nursing communication   Order Comments: Ok to resume ASA and NSAIDs pod #2     Weight bearing as tolerated   Order Comments: Weight bearing as tolerated.     Follow-up Information     Shawn Bañuelos MD In 2 weeks.    Specialty:  Orthopedic Surgery  Contact information:  [address] [place] [address] [phone]                   Discharge Procedure Orders (must include Diet, Follow-up, Activity):   Discharge Procedure Orders (must include Diet, Follow-up, Activity)   CRUTCHES FOR HOME USE     Order Specific Question Answer Comments   Type: Axillary    Height: 5' 1" (1.549 m)    Weight: 59.9 kg (132 lb 0.9 oz)    Length of need (1-99 months): 2      Diet general     Call MD for:  temperature >100.4     Call MD for:  persistent nausea and vomiting     Call MD for:  severe uncontrolled pain     Call MD " for:  difficulty breathing, headache or visual disturbances     Call MD for:  redness, tenderness, or signs of infection (pain, swelling, redness, odor or green/yellow discharge around incision site)     Call MD for:  hives     Call MD for:  persistent dizziness or light-headedness     Call MD for:  extreme fatigue     Keep surgical extremity elevated     No driving, operating heavy equipment or signing legal documents while taking pain medication     Leave dressing on - Keep it clean, dry, and intact until clinic visit     Nursing communication   Order Comments: Ok to resume ASA and NSAIDs pod #2     Weight bearing as tolerated   Order Comments: Weight bearing as tolerated.

## 2019-12-10 ENCOUNTER — TELEPHONE (OUTPATIENT)
Dept: ORTHOPEDICS | Facility: CLINIC | Age: 64
End: 2019-12-10

## 2019-12-10 VITALS
RESPIRATION RATE: 16 BRPM | WEIGHT: 132.06 LBS | TEMPERATURE: 98 F | DIASTOLIC BLOOD PRESSURE: 62 MMHG | OXYGEN SATURATION: 97 % | HEART RATE: 67 BPM | SYSTOLIC BLOOD PRESSURE: 121 MMHG | HEIGHT: 61 IN | BODY MASS INDEX: 24.93 KG/M2

## 2019-12-10 DIAGNOSIS — Z98.890 S/P EXCISION OF GANGLION CYST: ICD-10-CM

## 2019-12-10 DIAGNOSIS — M67.472 GANGLION CYST OF LEFT FOOT: Primary | ICD-10-CM

## 2019-12-10 NOTE — TELEPHONE ENCOUNTER
----- Message from Ronal Conway MA sent at 12/10/2019  1:52 PM CST -----  Contact: miguel lopez[facility]  Patient had surgery on yesteday, was supposed to get knee scooter but only order for crutches, no order for scooter  Call back   Fax

## 2019-12-11 ENCOUNTER — TELEPHONE (OUTPATIENT)
Dept: ORTHOPEDICS | Facility: CLINIC | Age: 64
End: 2019-12-11

## 2019-12-11 NOTE — TELEPHONE ENCOUNTER
Returned patient's call. Patient screaming in the phone that she was promised a knee scooter and she wants it now. Advised patient that Dr. Bañuelos ordered crutches as she is weight bearing as tolerated. Instructed that a knee scooter was ordered yesterday as she requested it yesterday. Attempted to advised that per our DME tech in office [employer] will not cover the knee scooter and she would have to rent it. Patient states that she has medicaid as a secondary. Advised that medicaid does not cover this as well. Patient states that she is not renting anything. Offered to have a wheelchair or a walker ordered for the patient. Patient declined. Will have supervisor from [facility] to contact patient. Patient stated understanding.

## 2019-12-11 NOTE — TELEPHONE ENCOUNTER
"----- Message from Ronal Conway MA sent at 12/11/2019 10:51 AM CST -----  Contact: Patient  Patient had surgery on Monday, 12/9, was supposed to have knee scooter upon returning home from surgery, it is Wednesday and she still does not have it, she has spoken with her insurance co [employer], representative said he spoke with someone and was told " they were too busy, they have to take patients in order, she has spoken with Martha in [facility] and was told scooter was just ordered on yesterday on 12/10 at 2:20 and it's going to take seven days, patient is in pain and trying not to bear weight on the foot, please follow up  Call back   "

## 2019-12-11 NOTE — TELEPHONE ENCOUNTER
----- Message from Yoni Wright sent at 12/11/2019  9:01 AM CST -----  Contact: self  Patient want to speak with a nurse regarding never receiving her knee walker to get around please call back at [phone] (home)     Case number [id number]

## 2019-12-11 NOTE — TELEPHONE ENCOUNTER
Patient states that the knee scooter never arrived yesterday. Advised that it is pending insurance authorization. Patient states understanding.

## 2019-12-20 ENCOUNTER — TELEPHONE (OUTPATIENT)
Dept: ORTHOPEDICS | Facility: CLINIC | Age: 64
End: 2019-12-20

## 2019-12-20 NOTE — TELEPHONE ENCOUNTER
----- Message from Ronal Conway MA sent at 12/20/2019  9:44 AM CST -----  Contact: Patient  Patient returning call to [facility]  Call back

## 2019-12-20 NOTE — TELEPHONE ENCOUNTER
----- Message from Mahi Colmenares MA sent at 12/20/2019  9:07 AM CST -----  Contact: pt   Post op   Ace bandage,   Smell coming from it   No fever   Call back

## 2019-12-20 NOTE — TELEPHONE ENCOUNTER
Notified patient per Dr. Bañuelos to come in to have bandage change with the nurse. Patient unable to come in today but will come on Monday to have bandage changed.

## 2019-12-23 ENCOUNTER — CLINICAL SUPPORT (OUTPATIENT)
Dept: ORTHOPEDICS | Facility: CLINIC | Age: 64
End: 2019-12-23
Payer: MEDICARE

## 2019-12-23 DIAGNOSIS — M67.472 GANGLION CYST OF LEFT FOOT: Primary | ICD-10-CM

## 2019-12-23 PROCEDURE — 99024 POSTOP FOLLOW-UP VISIT: CPT | Mod: S$GLB,,, | Performed by: ORTHOPAEDIC SURGERY

## 2019-12-23 PROCEDURE — 99024 SUTURE REMOVAL: ICD-10-PCS | Mod: S$GLB,,, | Performed by: ORTHOPAEDIC SURGERY

## 2019-12-23 NOTE — PROGRESS NOTES
Patient here this morning to have left foot bandage changed. Notified Dr. Bañuelos of incision with sutures intact, no redness, no drainage, no odor. Dr. Bañuelos gave new orders for sutures to be removed, apply steri strips, f/u on 1/13/20. Sutures removed without difficulty. Incision remained intact. Steri strips applied. Educated patient and  not to remove the steri strips until 12/26/19. F/u scheduled for 1/13/20.

## 2019-12-24 LAB
FINAL PATHOLOGIC DIAGNOSIS: NORMAL
GROSS: NORMAL

## 2020-01-13 ENCOUNTER — OFFICE VISIT (OUTPATIENT)
Dept: ORTHOPEDICS | Facility: CLINIC | Age: 65
End: 2020-01-13
Payer: MEDICARE

## 2020-01-13 VITALS
HEIGHT: 61 IN | HEART RATE: 106 BPM | DIASTOLIC BLOOD PRESSURE: 73 MMHG | SYSTOLIC BLOOD PRESSURE: 113 MMHG | WEIGHT: 132 LBS | BODY MASS INDEX: 24.92 KG/M2

## 2020-01-13 DIAGNOSIS — M67.472 GANGLION CYST OF LEFT FOOT: Primary | ICD-10-CM

## 2020-01-13 PROCEDURE — 99999 PR PBB SHADOW E&M-EST. PATIENT-LVL III: CPT | Mod: PBBFAC,,, | Performed by: ORTHOPAEDIC SURGERY

## 2020-01-13 PROCEDURE — 99024 PR POST-OP FOLLOW-UP VISIT: ICD-10-PCS | Mod: S$GLB,,, | Performed by: ORTHOPAEDIC SURGERY

## 2020-01-13 PROCEDURE — 99999 PR PBB SHADOW E&M-EST. PATIENT-LVL III: ICD-10-PCS | Mod: PBBFAC,,, | Performed by: ORTHOPAEDIC SURGERY

## 2020-01-13 PROCEDURE — 99024 POSTOP FOLLOW-UP VISIT: CPT | Mod: S$GLB,,, | Performed by: ORTHOPAEDIC SURGERY

## 2020-01-13 NOTE — PROGRESS NOTES
Subjective:      Patient ID: Irish Felder is a 64 y.o. female.    Chief Complaint: Post-op Evaluation of the Left Foot and Post-op Evaluation (Ganglion cyst excision.DOS: 12/9/2019)    Pt is here for f/u today. She rates her pain as 6/10 today. She notes swelling and the boot rubbing the inside of her foot.     Social History     Occupational History    Not on file   Tobacco Use    Smoking status: Never Smoker    Smokeless tobacco: Never Used   Substance and Sexual Activity    Alcohol use: Not Currently     Comment: socially     Drug use: No    Sexual activity: Not on file            Objective:    Ortho Exam     LLE: Neurovascularly intact, incision well healed, moderate dependent edema.  No signs of infection. Mildly decreased ROM of the ankle.     Assessment:     Post-op Evaluation of the Left Foot and Post-op Evaluation (Ganglion cyst excision.DOS: 12/9/2019)      Plan:        Scar tissue massage. Compression stocking. DC boot.

## 2020-12-21 ENCOUNTER — OFFICE VISIT (OUTPATIENT)
Dept: PHYSICAL MEDICINE AND REHAB | Facility: CLINIC | Age: 65
End: 2020-12-21
Payer: MEDICARE

## 2020-12-21 VITALS — WEIGHT: 134 LBS | HEIGHT: 61 IN | BODY MASS INDEX: 25.3 KG/M2

## 2020-12-21 DIAGNOSIS — M17.12 ARTHROPATHY OF LEFT KNEE: Primary | ICD-10-CM

## 2020-12-21 PROCEDURE — 20611 LARGE JOINT ASPIRATION/INJECTION: L KNEE: ICD-10-PCS | Mod: S$PBB,LT,, | Performed by: PHYSICAL MEDICINE & REHABILITATION

## 2020-12-21 PROCEDURE — 99999 PR PBB SHADOW E&M-EST. PATIENT-LVL III: CPT | Mod: PBBFAC,,, | Performed by: PHYSICAL MEDICINE & REHABILITATION

## 2020-12-21 PROCEDURE — 99213 PR OFFICE/OUTPT VISIT, EST, LEVL III, 20-29 MIN: ICD-10-PCS | Mod: 25,S$PBB,, | Performed by: PHYSICAL MEDICINE & REHABILITATION

## 2020-12-21 PROCEDURE — 99213 OFFICE O/P EST LOW 20 MIN: CPT | Mod: PBBFAC,PN,25 | Performed by: PHYSICAL MEDICINE & REHABILITATION

## 2020-12-21 PROCEDURE — 20611 DRAIN/INJ JOINT/BURSA W/US: CPT | Mod: PBBFAC,PN,LT | Performed by: PHYSICAL MEDICINE & REHABILITATION

## 2020-12-21 PROCEDURE — 99213 OFFICE O/P EST LOW 20 MIN: CPT | Mod: 25,S$PBB,, | Performed by: PHYSICAL MEDICINE & REHABILITATION

## 2020-12-21 PROCEDURE — 99999 PR PBB SHADOW E&M-EST. PATIENT-LVL III: ICD-10-PCS | Mod: PBBFAC,,, | Performed by: PHYSICAL MEDICINE & REHABILITATION

## 2020-12-21 RX ORDER — HYOSCYAMINE SULFATE 0.125 MG
TABLET ORAL
COMMUNITY
Start: 2020-10-15 | End: 2023-11-21

## 2020-12-21 RX ORDER — LIDOCAINE AND PRILOCAINE 25; 25 MG/G; MG/G
CREAM TOPICAL
COMMUNITY
Start: 2020-11-24 | End: 2023-11-21

## 2020-12-21 RX ORDER — TRIAMCINOLONE ACETONIDE 40 MG/ML
40 INJECTION, SUSPENSION INTRA-ARTICULAR; INTRAMUSCULAR
Status: DISCONTINUED | OUTPATIENT
Start: 2020-12-21 | End: 2020-12-21 | Stop reason: HOSPADM

## 2020-12-21 RX ADMIN — TRIAMCINOLONE ACETONIDE 40 MG: 40 INJECTION, SUSPENSION INTRA-ARTICULAR; INTRAMUSCULAR at 11:12

## 2020-12-21 NOTE — PROGRESS NOTES
[facility]    CHIEF COMPLAINT:   Chief Complaint   Patient presents with    Knee Pain     left side     HISTORY OF PRESENT ILLNESS: Irish Felder is a 65 y.o. female who presents to me on self-referral for evaluation of left knee pain. We previously treated her right knee pain in 2018 with steroid injections, she responded well to this. She reports having a lumbar spinal surgery in August 2020 and due to post-procedural pain, she began to alter her biomechanics and began noticing left knee pain along the medial joint line. She describes a deep, sharp pain that is continuous. It does not respond to Tramadol, topical cream, heat/ice or massage. It lasts all day and is worse with walking. Denies any popping, clicking or grinding. The pain wakes her up at night. She is currently undergoing physical therapy for her back but has been doing some stretches involving her lower extremities at times. She has not had any imaging or injections to this knee before.     Review of Systems   Constitutional: Negative for fever.   HENT: Negative for drooling.    Eyes: Negative for discharge.   Respiratory: Negative for choking.    Cardiovascular: Negative for chest pain.   Genitourinary: Negative for flank pain.   Skin: Negative for wound.   Allergic/Immunologic: Negative for immunocompromised state.   Neurological: Negative for tremors and syncope.   Psychiatric/Behavioral: Negative for behavioral problems.     Past Medical History:   Past Medical History:   Diagnosis Date    Anemia     Cancer     skin cancer - nose    Chest pain 2016    GERD (gastroesophageal reflux disease)     Hyperlipidemia     Seasonal allergies        Past Surgical History:   Past Surgical History:   Procedure Laterality Date    AUGMENTATION OF BREAST Bilateral 2010    BACK SURGERY  09/2017    Cyst removed between L4-L5, Dr. Mercedes    BACK SURGERY  08/10/2020    L4-5 fusion, laminectomy    BREAST SURGERY Bilateral     2010     CATARACT EXTRACTION EXTRACAPSULAR W/ INTRAOCULAR LENS IMPLANTATION Bilateral 2012    CHOLECYSTECTOMY      COLONOSCOPY      EYE SURGERY Right 2014    retina surgery    FOOT GANGLION EXCISION Left 12/9/2019    Procedure: EXCISION, GANGLION CYST, FOOT;  Surgeon: Shawn Bañuelos MD;  Location: [facility] OR;  Service: Orthopedics;  Laterality: Left;    HERNIA REPAIR      x2    HYSTERECTOMY  1985    LASIK Bilateral 2004    OU    OOPHORECTOMY  1985    TENDON RELEASE Right 2009    right heel    TONSILLECTOMY      TOTAL REDUCTION MAMMOPLASTY Bilateral 1983    TUMOR EXCISION Left     Below shoulder    UPPER GASTROINTESTINAL ENDOSCOPY         Family History:   Family History   Problem Relation Age of Onset    Cataracts Mother     Cancer Mother         lung    Diabetes Father     Cancer Father         lung    Cataracts Sister        Medications:   Current Outpatient Medications on File Prior to Visit   Medication Sig Dispense Refill    ARMOUR THYROID 60 mg Tab TK 1 T PO D  6    betamethasone dipropionate (DIPROLENE) 0.05 % lotion       calcium carbonate (CALCIUM CARBONATE) 300 mg (750 mg) Chew Take by mouth as needed.        ezetimibe (ZETIA) 10 mg tablet       FLUoxetine (PROZAC) 20 MG capsule Take 20 mg by mouth Every 3 (three) days.  5    fluticasone propionate (FLONASE) 50 mcg/actuation nasal spray SHAKE LQ AND U 1 SPR IEN QD      hyoscyamine (ANASPAZ,LEVSIN) 0.125 mg Tab TK 1 T PO Q 4 H PRN      lidocaine-prilocaine (EMLA) cream APPLY 1-2 GRAMS TO THE AFFECTED AREAS 3-4 TIMES DAILY STARTING 6 WEEKS POST SURGERY.      multivitamin capsule Take 1 capsule by mouth once daily.        nitroGLYCERIN (NITROSTAT) 0.4 MG SL tablet TK ONE T PO PRN  6    ondansetron (ZOFRAN-ODT) 4 MG TbDL Take 2 tablets (8 mg total) by mouth every 8 (eight) hours as needed. 6 tablet 1    RESTASIS 0.05 % ophthalmic emulsion INT 1 GTT IN EACH EYE BID      rosuvastatin (CRESTOR) 40 MG Tab TK 1 T PO QD  2    SANARE ADV SCAR  THERAPY BASE Gel APPLY 1/2 GRAM (1 PUMP) TO AFFECTED AREAS TWICE DAILY STARTING 6 WEEKS POST SURGERY.      traMADol (ULTRAM) 50 mg tablet Take 50 mg by mouth every 8 (eight) hours as needed.       trazodone (DESYREL) 50 MG tablet Take 50 mg by mouth every evening. Takes 1/2 tablet HS      valACYclovir (VALTREX) 1000 MG tablet       AMITIZA 24 mcg Cap TK 1 C PO BID      diclofenac sodium (VOLTAREN) 1 % Gel Apply 2 g topically 4 (four) times daily. 4 Tube 1    [DISCONTINUED] SYMAX DUOTAB 0.125 mg-0.25 mg (0.375 mg) TbMP TK 1 T PO TID PRF CRAMPING  1     No current facility-administered medications on file prior to visit.        Allergies: Review of patient's allergies indicates:  No Known Allergies    Social History:   Social History     Socioeconomic History    Marital status:      Spouse name: Not on file    Number of children: Not on file    Years of education: Not on file    Highest education level: Not on file   Occupational History    Not on file   Social Needs    Financial resource strain: Not on file    Food insecurity     Worry: Not on file     Inability: Not on file    Transportation needs     Medical: Not on file     Non-medical: Not on file   Tobacco Use    Smoking status: Never Smoker    Smokeless tobacco: Never Used   Substance and Sexual Activity    Alcohol use: Not Currently     Comment: socially     Drug use: No    Sexual activity: Not on file   Lifestyle    Physical activity     Days per week: Not on file     Minutes per session: Not on file    Stress: Not on file   Relationships    Social connections     Talks on phone: Not on file     Gets together: Not on file     Attends Synagogue service: Not on file     Active member of club or organization: Not on file     Attends meetings of clubs or organizations: Not on file     Relationship status: Not on file   Other Topics Concern    Not on file   Social History Narrative    Not on file     PHYSICAL EXAMINATION:  "  General  Ht 5' 1" (1.549 m)   Wt 60.8 kg (134 lb)   BMI 25.32 kg/m²   Constitutional: Oriented to person, place, and time. No apparent distress. Pleasant.  HENT:   Head: Normocephalic and atraumatic.   Eyes: Right eye exhibits no discharge. Left eye exhibits no discharge. No scleral icterus.   Pulmonary/Chest: Effort normal. No respiratory distress.   Abdominal: There is no guarding.   Neurological: Alert and oriented to person, place, and time.   Psychiatric: Behavior is normal.   Right Knee Exam     Other   Sensation: normal      Left Knee Exam     Tenderness   The patient is experiencing tenderness in the medial joint line.    Range of Motion   Extension: 0   Flexion: 130 (with pain at end of flexion)     Tests   Varus: negative Valgus: negative  Lachman:  Anterior - negative    Posterior - negative  Drawer:  Anterior - negative     Posterior - negative  Pivot shift: negative  Patellar apprehension: negative    Other   Erythema: absent  Scars: absent  Sensation: normal  Swelling: none  Effusion: no effusion present    Comments:  - Lachman  + Bounce home        INSPECTION: There is no swelling, ecchymoses, erythema or gross deformity of the left knee.  GAIT/DYNAMIC: Mildly antalgic    Imaging  Bilateral knee X-ray 2/22/17    Data Reviewed: X-ray    Supportive Actions: Independent visualization of images or test specimens    ASSESSMENT:   1. Arthropathy of left knee      PLAN:     1.  We discussed that her knee pain is likely secondary to some degenerative arthrosis of the cartilage or meniscus of the left knee.  Knee feels stable on physical exam and she has no effusion.  We discussed conservative management should be best course of action.  We reviewed conservative treatment options including ultrasound-guided corticosteroid injection and HA injection. She would like something today to help with the pain, thus she elected to proceed with an ultrasound-guided corticosteroid injection. Please see procedure " note.     2. She would also like to consider hyaluronic acid injections in the future. We will begin prior-authorization for this procedure and she will contact the office if/when today's corticosteroid injection wears off if she would like to proceed.     3. RTC as needed.  She may reach out at any time if any questions or issues arise.    The above note was completed, in part, with the aid of Dragon dictation software/hardware. Translation errors may be present.

## 2020-12-21 NOTE — PROCEDURES
Large Joint Aspiration/Injection: L knee    Date/Time: 12/21/2020 11:30 AM  Performed by: Adria Anguiano MD  Authorized by: Adria Anguiano MD     Consent Done?:  Yes (Verbal)  Indications:  Pain  Site marked: the procedure site was marked    Timeout: prior to procedure the correct patient, procedure, and site was verified    Prep: patient was prepped and draped in usual sterile fashion    Local anesthesia used?: No      Details:  Needle Size:  25 G  Ultrasonic Guidance for needle placement?: Yes    Images are saved and documented.  Approach: In plane, lateral to medial.  Location:  Knee  Site:  L knee  Medications:  40 mg triamcinolone acetonide 40 mg/mL  Patient tolerance:  Patient tolerated the procedure well with no immediate complications     Ultrasound guidance was used for correct needle placement, the images were saved will be uploaded to EMR.

## 2021-01-11 PROBLEM — M75.112 INCOMPLETE TEAR OF LEFT ROTATOR CUFF: Status: ACTIVE | Noted: 2021-01-11

## 2021-02-03 ENCOUNTER — OFFICE VISIT (OUTPATIENT)
Dept: URGENT CARE | Facility: CLINIC | Age: 66
End: 2021-02-03
Payer: MEDICARE

## 2021-02-03 VITALS
SYSTOLIC BLOOD PRESSURE: 116 MMHG | WEIGHT: 130 LBS | HEART RATE: 92 BPM | BODY MASS INDEX: 24.55 KG/M2 | TEMPERATURE: 98 F | DIASTOLIC BLOOD PRESSURE: 62 MMHG | HEIGHT: 61 IN | RESPIRATION RATE: 16 BRPM | OXYGEN SATURATION: 99 %

## 2021-02-03 DIAGNOSIS — S80.02XA CONTUSION OF LEFT KNEE, INITIAL ENCOUNTER: ICD-10-CM

## 2021-02-03 DIAGNOSIS — S46.912A STRAIN OF LEFT SHOULDER, INITIAL ENCOUNTER: ICD-10-CM
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
## 2021-02-03 DIAGNOSIS — M25.512 ACUTE PAIN OF LEFT SHOULDER: ICD-10-CM

## 2021-02-03 DIAGNOSIS — S80.01XA CONTUSION OF RIGHT KNEE, INITIAL ENCOUNTER: ICD-10-CM

## 2021-02-03 DIAGNOSIS — M79.645 PAIN OF LEFT THUMB: ICD-10-CM

## 2021-02-03 DIAGNOSIS — S63.642A SPRAIN OF METACARPOPHALANGEAL (MCP) JOINT OF LEFT THUMB, INITIAL ENCOUNTER: Primary | ICD-10-CM

## 2021-02-03 DIAGNOSIS — M25.561 ACUTE PAIN OF BOTH KNEES: ICD-10-CM

## 2021-02-03 DIAGNOSIS — M25.562 ACUTE PAIN OF BOTH KNEES: ICD-10-CM

## 2021-02-03 PROCEDURE — 99214 PR OFFICE/OUTPT VISIT, EST, LEVL IV, 30-39 MIN: ICD-10-PCS | Mod: S$GLB,,, | Performed by: PHYSICIAN ASSISTANT

## 2021-02-03 PROCEDURE — 73140 X-RAY EXAM OF FINGER(S): CPT | Mod: LT,S$GLB,, | Performed by: RADIOLOGY

## 2021-02-03 PROCEDURE — 73030 X-RAY EXAM OF SHOULDER: CPT | Mod: LT,S$GLB,, | Performed by: RADIOLOGY

## 2021-02-03 PROCEDURE — 73030 XR SHOULDER COMPLETE 2 OR MORE VIEWS LEFT: ICD-10-PCS | Mod: LT,S$GLB,, | Performed by: RADIOLOGY

## 2021-02-03 PROCEDURE — 73564 X-RAY EXAM KNEE 4 OR MORE: CPT | Mod: RT,S$GLB,, | Performed by: RADIOLOGY

## 2021-02-03 PROCEDURE — 73564 X-RAY EXAM KNEE 4 OR MORE: CPT | Mod: LT,S$GLB,, | Performed by: RADIOLOGY

## 2021-02-03 PROCEDURE — 3008F PR BODY MASS INDEX (BMI) DOCUMENTED: ICD-10-PCS | Mod: CPTII,S$GLB,, | Performed by: PHYSICIAN ASSISTANT

## 2021-02-03 PROCEDURE — 99214 OFFICE O/P EST MOD 30 MIN: CPT | Mod: S$GLB,,, | Performed by: PHYSICIAN ASSISTANT

## 2021-02-03 PROCEDURE — 73140 XR FINGER 2 OR MORE VIEWS: ICD-10-PCS | Mod: LT,S$GLB,, | Performed by: RADIOLOGY

## 2021-02-03 PROCEDURE — 3008F BODY MASS INDEX DOCD: CPT | Mod: CPTII,S$GLB,, | Performed by: PHYSICIAN ASSISTANT

## 2021-02-03 PROCEDURE — 73564 XR KNEE COMP 4 OR MORE VIEWS RIGHT: ICD-10-PCS | Mod: RT,S$GLB,, | Performed by: RADIOLOGY

## 2021-02-03 RX ORDER — CHLORHEXIDINE GLUCONATE ORAL RINSE 1.2 MG/ML
SOLUTION DENTAL
COMMUNITY
Start: 2021-01-06 | End: 2023-11-21

## 2021-02-03 RX ORDER — ASPIRIN 81 MG/1
81 TABLET ORAL DAILY
COMMUNITY

## 2021-03-29 ENCOUNTER — OFFICE VISIT (OUTPATIENT)
Dept: PHYSICAL MEDICINE AND REHAB | Facility: CLINIC | Age: 66
End: 2021-03-29
Payer: MEDICARE

## 2021-03-29 ENCOUNTER — HOSPITAL ENCOUNTER (OUTPATIENT)
Dept: RADIOLOGY | Facility: HOSPITAL | Age: 66
Discharge: HOME | End: 2021-03-29
Attending: PHYSICAL MEDICINE & REHABILITATION
Payer: MEDICARE

## 2021-03-29 VITALS — WEIGHT: 130 LBS | BODY MASS INDEX: 24.55 KG/M2 | HEIGHT: 61 IN

## 2021-03-29 DIAGNOSIS — M79.642 PAIN IN BOTH HANDS: ICD-10-CM

## 2021-03-29 DIAGNOSIS — M25.562 CHRONIC PAIN OF BOTH KNEES: ICD-10-CM

## 2021-03-29 DIAGNOSIS — M79.641 PAIN IN BOTH HANDS: ICD-10-CM

## 2021-03-29 DIAGNOSIS — M17.11 ARTHROPATHY OF RIGHT KNEE: ICD-10-CM

## 2021-03-29 DIAGNOSIS — M25.561 CHRONIC PAIN OF BOTH KNEES: ICD-10-CM

## 2021-03-29 DIAGNOSIS — G89.29 CHRONIC PAIN OF BOTH KNEES: ICD-10-CM

## 2021-03-29 DIAGNOSIS — S80.01XA CONTUSION OF RIGHT KNEE, INITIAL ENCOUNTER: Primary | ICD-10-CM

## 2021-03-29 PROCEDURE — 73130 X-RAY EXAM OF HAND: CPT | Mod: TC,50,PO

## 2021-03-29 PROCEDURE — 1101F PR PT FALLS ASSESS DOC 0-1 FALLS W/OUT INJ PAST YR: ICD-10-PCS | Mod: CPTII,S$GLB,, | Performed by: PHYSICAL MEDICINE & REHABILITATION

## 2021-03-29 PROCEDURE — 1125F AMNT PAIN NOTED PAIN PRSNT: CPT | Mod: S$GLB,,, | Performed by: PHYSICAL MEDICINE & REHABILITATION

## 2021-03-29 PROCEDURE — 3008F BODY MASS INDEX DOCD: CPT | Mod: CPTII,S$GLB,, | Performed by: PHYSICAL MEDICINE & REHABILITATION

## 2021-03-29 PROCEDURE — 3288F PR FALLS RISK ASSESSMENT DOCUMENTED: ICD-10-PCS | Mod: CPTII,S$GLB,, | Performed by: PHYSICAL MEDICINE & REHABILITATION

## 2021-03-29 PROCEDURE — 99213 PR OFFICE/OUTPT VISIT, EST, LEVL III, 20-29 MIN: ICD-10-PCS | Mod: S$GLB,,, | Performed by: PHYSICAL MEDICINE & REHABILITATION

## 2021-03-29 PROCEDURE — 99999 PR PBB SHADOW E&M-EST. PATIENT-LVL IV: ICD-10-PCS | Mod: PBBFAC,,, | Performed by: PHYSICAL MEDICINE & REHABILITATION

## 2021-03-29 PROCEDURE — 1159F PR MEDICATION LIST DOCUMENTED IN MEDICAL RECORD: ICD-10-PCS | Mod: S$GLB,,, | Performed by: PHYSICAL MEDICINE & REHABILITATION

## 2021-03-29 PROCEDURE — 1101F PT FALLS ASSESS-DOCD LE1/YR: CPT | Mod: CPTII,S$GLB,, | Performed by: PHYSICAL MEDICINE & REHABILITATION

## 2021-03-29 PROCEDURE — 99999 PR PBB SHADOW E&M-EST. PATIENT-LVL IV: CPT | Mod: PBBFAC,,, | Performed by: PHYSICAL MEDICINE & REHABILITATION

## 2021-03-29 PROCEDURE — 73130 XR HAND COMPLETE 3 VIEWS BILATERAL: ICD-10-PCS | Mod: 26,50,, | Performed by: RADIOLOGY

## 2021-03-29 PROCEDURE — 3288F FALL RISK ASSESSMENT DOCD: CPT | Mod: CPTII,S$GLB,, | Performed by: PHYSICAL MEDICINE & REHABILITATION

## 2021-03-29 PROCEDURE — 73564 XR KNEE ORTHO BILAT WITH FLEXION: ICD-10-PCS | Mod: 26,50,, | Performed by: RADIOLOGY

## 2021-03-29 PROCEDURE — 1125F PR PAIN SEVERITY QUANTIFIED, PAIN PRESENT: ICD-10-PCS | Mod: S$GLB,,, | Performed by: PHYSICAL MEDICINE & REHABILITATION

## 2021-03-29 PROCEDURE — 73564 X-RAY EXAM KNEE 4 OR MORE: CPT | Mod: TC,50,PO

## 2021-03-29 PROCEDURE — 1159F MED LIST DOCD IN RCRD: CPT | Mod: S$GLB,,, | Performed by: PHYSICAL MEDICINE & REHABILITATION

## 2021-03-29 PROCEDURE — 73564 X-RAY EXAM KNEE 4 OR MORE: CPT | Mod: 26,50,, | Performed by: RADIOLOGY

## 2021-03-29 PROCEDURE — 3008F PR BODY MASS INDEX (BMI) DOCUMENTED: ICD-10-PCS | Mod: CPTII,S$GLB,, | Performed by: PHYSICAL MEDICINE & REHABILITATION

## 2021-03-29 PROCEDURE — 99213 OFFICE O/P EST LOW 20 MIN: CPT | Mod: S$GLB,,, | Performed by: PHYSICAL MEDICINE & REHABILITATION

## 2021-03-29 PROCEDURE — 73130 X-RAY EXAM OF HAND: CPT | Mod: 26,50,, | Performed by: RADIOLOGY

## 2021-03-29 RX ORDER — METHYLPREDNISOLONE 4 MG/1
TABLET ORAL
Qty: 1 PACKAGE | Refills: 0 | Status: SHIPPED | OUTPATIENT
Start: 2021-03-29 | End: 2021-04-19

## 2021-03-31 ENCOUNTER — OFFICE VISIT (OUTPATIENT)
Dept: ORTHOPEDICS | Facility: CLINIC | Age: 66
End: 2021-03-31
Payer: MEDICARE

## 2021-03-31 ENCOUNTER — IMMUNIZATION (OUTPATIENT)
Dept: PRIMARY CARE CLINIC | Facility: CLINIC | Age: 66
End: 2021-03-31
Payer: MEDICARE

## 2021-03-31 VITALS
SYSTOLIC BLOOD PRESSURE: 109 MMHG | HEIGHT: 61 IN | DIASTOLIC BLOOD PRESSURE: 68 MMHG | HEART RATE: 97 BPM | BODY MASS INDEX: 24.55 KG/M2 | WEIGHT: 130 LBS

## 2021-03-31 DIAGNOSIS — S63.642A SPRAIN OF METACARPOPHALANGEAL (MCP) JOINT OF LEFT THUMB, INITIAL ENCOUNTER: Primary | ICD-10-CM

## 2021-03-31 DIAGNOSIS — Z23 NEED FOR VACCINATION: Primary | ICD-10-CM

## 2021-03-31 DIAGNOSIS — M18.0 PRIMARY ARTHROSIS OF FIRST CARPOMETACARPAL JOINTS, BILATERAL: ICD-10-CM

## 2021-03-31 PROCEDURE — 3288F PR FALLS RISK ASSESSMENT DOCUMENTED: ICD-10-PCS | Mod: CPTII,S$GLB,, | Performed by: ORTHOPAEDIC SURGERY

## 2021-03-31 PROCEDURE — 99203 PR OFFICE/OUTPT VISIT, NEW, LEVL III, 30-44 MIN: ICD-10-PCS | Mod: S$GLB,,, | Performed by: ORTHOPAEDIC SURGERY

## 2021-03-31 PROCEDURE — 3008F PR BODY MASS INDEX (BMI) DOCUMENTED: ICD-10-PCS | Mod: CPTII,S$GLB,, | Performed by: ORTHOPAEDIC SURGERY

## 2021-03-31 PROCEDURE — 99203 OFFICE O/P NEW LOW 30 MIN: CPT | Mod: S$GLB,,, | Performed by: ORTHOPAEDIC SURGERY

## 2021-03-31 PROCEDURE — 1100F PTFALLS ASSESS-DOCD GE2>/YR: CPT | Mod: CPTII,S$GLB,, | Performed by: ORTHOPAEDIC SURGERY

## 2021-03-31 PROCEDURE — 91303 PR SARSCOV2 VAC AD26 .5ML IM: CPT | Mod: S$GLB,,, | Performed by: INTERNAL MEDICINE

## 2021-03-31 PROCEDURE — 3288F FALL RISK ASSESSMENT DOCD: CPT | Mod: CPTII,S$GLB,, | Performed by: ORTHOPAEDIC SURGERY

## 2021-03-31 PROCEDURE — 3008F BODY MASS INDEX DOCD: CPT | Mod: CPTII,S$GLB,, | Performed by: ORTHOPAEDIC SURGERY

## 2021-03-31 PROCEDURE — 1159F MED LIST DOCD IN RCRD: CPT | Mod: S$GLB,,, | Performed by: ORTHOPAEDIC SURGERY

## 2021-03-31 PROCEDURE — 1159F PR MEDICATION LIST DOCUMENTED IN MEDICAL RECORD: ICD-10-PCS | Mod: S$GLB,,, | Performed by: ORTHOPAEDIC SURGERY

## 2021-03-31 PROCEDURE — 99999 PR PBB SHADOW E&M-EST. PATIENT-LVL IV: ICD-10-PCS | Mod: PBBFAC,,, | Performed by: ORTHOPAEDIC SURGERY

## 2021-03-31 PROCEDURE — 91303 PR SARSCOV2 VAC AD26 .5ML IM: ICD-10-PCS | Mod: S$GLB,,, | Performed by: INTERNAL MEDICINE

## 2021-03-31 PROCEDURE — 0031A PR IMMUNIZ ADMIN, SARS-COV-2 COVID-19 VACC, 5X10VP/0.5ML: CPT | Mod: CV19,S$GLB,, | Performed by: INTERNAL MEDICINE

## 2021-03-31 PROCEDURE — 1125F AMNT PAIN NOTED PAIN PRSNT: CPT | Mod: S$GLB,,, | Performed by: ORTHOPAEDIC SURGERY

## 2021-03-31 PROCEDURE — 1100F PR PT FALLS ASSESS DOC 2+ FALLS/FALL W/INJURY/YR: ICD-10-PCS | Mod: CPTII,S$GLB,, | Performed by: ORTHOPAEDIC SURGERY

## 2021-03-31 PROCEDURE — 1125F PR PAIN SEVERITY QUANTIFIED, PAIN PRESENT: ICD-10-PCS | Mod: S$GLB,,, | Performed by: ORTHOPAEDIC SURGERY

## 2021-03-31 PROCEDURE — 99999 PR PBB SHADOW E&M-EST. PATIENT-LVL IV: CPT | Mod: PBBFAC,,, | Performed by: ORTHOPAEDIC SURGERY

## 2021-03-31 PROCEDURE — 0031A PR IMMUNIZ ADMIN, SARS-COV-2 COVID-19 VACC, 5X10VP/0.5ML: ICD-10-PCS | Mod: CV19,S$GLB,, | Performed by: INTERNAL MEDICINE

## 2021-04-12 ENCOUNTER — TELEPHONE (OUTPATIENT)
Dept: ORTHOPEDICS | Facility: CLINIC | Age: 66
End: 2021-04-12

## 2021-04-14 ENCOUNTER — OFFICE VISIT (OUTPATIENT)
Dept: ORTHOPEDICS | Facility: CLINIC | Age: 66
End: 2021-04-14
Payer: MEDICARE

## 2021-04-14 VITALS — DIASTOLIC BLOOD PRESSURE: 64 MMHG | HEART RATE: 100 BPM | SYSTOLIC BLOOD PRESSURE: 109 MMHG

## 2021-04-14 DIAGNOSIS — M18.0 PRIMARY ARTHROSIS OF FIRST CARPOMETACARPAL JOINTS, BILATERAL: Primary | ICD-10-CM

## 2021-04-14 PROCEDURE — 3288F FALL RISK ASSESSMENT DOCD: CPT | Mod: CPTII,S$GLB,, | Performed by: ORTHOPAEDIC SURGERY

## 2021-04-14 PROCEDURE — 1125F PR PAIN SEVERITY QUANTIFIED, PAIN PRESENT: ICD-10-PCS | Mod: S$GLB,,, | Performed by: ORTHOPAEDIC SURGERY

## 2021-04-14 PROCEDURE — 99213 PR OFFICE/OUTPT VISIT, EST, LEVL III, 20-29 MIN: ICD-10-PCS | Mod: 25,S$GLB,, | Performed by: ORTHOPAEDIC SURGERY

## 2021-04-14 PROCEDURE — 3288F PR FALLS RISK ASSESSMENT DOCUMENTED: ICD-10-PCS | Mod: CPTII,S$GLB,, | Performed by: ORTHOPAEDIC SURGERY

## 2021-04-14 PROCEDURE — 1101F PT FALLS ASSESS-DOCD LE1/YR: CPT | Mod: CPTII,S$GLB,, | Performed by: ORTHOPAEDIC SURGERY

## 2021-04-14 PROCEDURE — 1159F PR MEDICATION LIST DOCUMENTED IN MEDICAL RECORD: ICD-10-PCS | Mod: S$GLB,,, | Performed by: ORTHOPAEDIC SURGERY

## 2021-04-14 PROCEDURE — 1159F MED LIST DOCD IN RCRD: CPT | Mod: S$GLB,,, | Performed by: ORTHOPAEDIC SURGERY

## 2021-04-14 PROCEDURE — 20600 DRAIN/INJ JOINT/BURSA W/O US: CPT | Mod: LT,S$GLB,, | Performed by: ORTHOPAEDIC SURGERY

## 2021-04-14 PROCEDURE — 1125F AMNT PAIN NOTED PAIN PRSNT: CPT | Mod: S$GLB,,, | Performed by: ORTHOPAEDIC SURGERY

## 2021-04-14 PROCEDURE — 99213 OFFICE O/P EST LOW 20 MIN: CPT | Mod: 25,S$GLB,, | Performed by: ORTHOPAEDIC SURGERY

## 2021-04-14 PROCEDURE — 1101F PR PT FALLS ASSESS DOC 0-1 FALLS W/OUT INJ PAST YR: ICD-10-PCS | Mod: CPTII,S$GLB,, | Performed by: ORTHOPAEDIC SURGERY

## 2021-04-14 PROCEDURE — 99999 PR PBB SHADOW E&M-EST. PATIENT-LVL III: ICD-10-PCS | Mod: PBBFAC,,, | Performed by: ORTHOPAEDIC SURGERY

## 2021-04-14 PROCEDURE — 20600 SMALL JOINT ASPIRATION/INJECTION: L THUMB CMC: ICD-10-PCS | Mod: LT,S$GLB,, | Performed by: ORTHOPAEDIC SURGERY

## 2021-04-14 PROCEDURE — 99999 PR PBB SHADOW E&M-EST. PATIENT-LVL III: CPT | Mod: PBBFAC,,, | Performed by: ORTHOPAEDIC SURGERY

## 2021-04-14 RX ORDER — TRIAMCINOLONE ACETONIDE 40 MG/ML
40 INJECTION, SUSPENSION INTRA-ARTICULAR; INTRAMUSCULAR
Status: DISCONTINUED | OUTPATIENT
Start: 2021-04-14 | End: 2021-04-14 | Stop reason: HOSPADM

## 2021-04-14 RX ADMIN — TRIAMCINOLONE ACETONIDE 40 MG: 40 INJECTION, SUSPENSION INTRA-ARTICULAR; INTRAMUSCULAR at 01:04

## 2022-01-13 ENCOUNTER — TELEPHONE (OUTPATIENT)
Dept: ORTHOPEDICS | Facility: CLINIC | Age: 67
End: 2022-01-13
Payer: MEDICARE

## 2022-01-13 NOTE — TELEPHONE ENCOUNTER
Message sent to clinic supervisor Silver Aburto to call pt, per  pt cancelled too many appointments with him and she can seek care elsewhere.

## 2022-01-13 NOTE — TELEPHONE ENCOUNTER
----- Message from Mhai Colmenares MA sent at 1/13/2022 10:13 AM CST -----  Contact: pt  Pt has 7 cancellations in chart   Caller: pt, (Today, 10:07 AM)  [facility] [address] [place]   States she got her hand caught in  ,   No cuts just bruised   Call back

## 2022-03-21 ENCOUNTER — OFFICE VISIT (OUTPATIENT)
Dept: ORTHOPEDICS | Facility: CLINIC | Age: 67
End: 2022-03-21
Payer: MEDICARE

## 2022-03-21 VITALS — BODY MASS INDEX: 24.55 KG/M2 | WEIGHT: 130 LBS | HEIGHT: 61 IN

## 2022-03-21 DIAGNOSIS — M18.12 ARTHRITIS OF CARPOMETACARPAL (CMC) JOINT OF LEFT THUMB: Primary | ICD-10-CM

## 2022-03-21 PROCEDURE — 20600 DRAIN/INJ JOINT/BURSA W/O US: CPT | Mod: LT,S$GLB,, | Performed by: ORTHOPAEDIC SURGERY

## 2022-03-21 PROCEDURE — 3288F FALL RISK ASSESSMENT DOCD: CPT | Mod: CPTII,S$GLB,, | Performed by: ORTHOPAEDIC SURGERY

## 2022-03-21 PROCEDURE — 3008F BODY MASS INDEX DOCD: CPT | Mod: CPTII,S$GLB,, | Performed by: ORTHOPAEDIC SURGERY

## 2022-03-21 PROCEDURE — 1159F PR MEDICATION LIST DOCUMENTED IN MEDICAL RECORD: ICD-10-PCS | Mod: CPTII,S$GLB,, | Performed by: ORTHOPAEDIC SURGERY

## 2022-03-21 PROCEDURE — 99999 PR PBB SHADOW E&M-EST. PATIENT-LVL III: ICD-10-PCS | Mod: PBBFAC,,, | Performed by: ORTHOPAEDIC SURGERY

## 2022-03-21 PROCEDURE — 99213 PR OFFICE/OUTPT VISIT, EST, LEVL III, 20-29 MIN: ICD-10-PCS | Mod: 25,S$GLB,, | Performed by: ORTHOPAEDIC SURGERY

## 2022-03-21 PROCEDURE — 99999 PR PBB SHADOW E&M-EST. PATIENT-LVL III: CPT | Mod: PBBFAC,,, | Performed by: ORTHOPAEDIC SURGERY

## 2022-03-21 PROCEDURE — 3288F PR FALLS RISK ASSESSMENT DOCUMENTED: ICD-10-PCS | Mod: CPTII,S$GLB,, | Performed by: ORTHOPAEDIC SURGERY

## 2022-03-21 PROCEDURE — 1125F PR PAIN SEVERITY QUANTIFIED, PAIN PRESENT: ICD-10-PCS | Mod: CPTII,S$GLB,, | Performed by: ORTHOPAEDIC SURGERY

## 2022-03-21 PROCEDURE — 1160F PR REVIEW ALL MEDS BY PRESCRIBER/CLIN PHARMACIST DOCUMENTED: ICD-10-PCS | Mod: CPTII,S$GLB,, | Performed by: ORTHOPAEDIC SURGERY

## 2022-03-21 PROCEDURE — 20600 PR DRAIN/INJECT SMALL JOINT/BURSA: ICD-10-PCS | Mod: LT,S$GLB,, | Performed by: ORTHOPAEDIC SURGERY

## 2022-03-21 PROCEDURE — 1101F PT FALLS ASSESS-DOCD LE1/YR: CPT | Mod: CPTII,S$GLB,, | Performed by: ORTHOPAEDIC SURGERY

## 2022-03-21 PROCEDURE — 99213 OFFICE O/P EST LOW 20 MIN: CPT | Mod: 25,S$GLB,, | Performed by: ORTHOPAEDIC SURGERY

## 2022-03-21 PROCEDURE — 1159F MED LIST DOCD IN RCRD: CPT | Mod: CPTII,S$GLB,, | Performed by: ORTHOPAEDIC SURGERY

## 2022-03-21 PROCEDURE — 3008F PR BODY MASS INDEX (BMI) DOCUMENTED: ICD-10-PCS | Mod: CPTII,S$GLB,, | Performed by: ORTHOPAEDIC SURGERY

## 2022-03-21 PROCEDURE — 1125F AMNT PAIN NOTED PAIN PRSNT: CPT | Mod: CPTII,S$GLB,, | Performed by: ORTHOPAEDIC SURGERY

## 2022-03-21 PROCEDURE — 1101F PR PT FALLS ASSESS DOC 0-1 FALLS W/OUT INJ PAST YR: ICD-10-PCS | Mod: CPTII,S$GLB,, | Performed by: ORTHOPAEDIC SURGERY

## 2022-03-21 PROCEDURE — 1160F RVW MEDS BY RX/DR IN RCRD: CPT | Mod: CPTII,S$GLB,, | Performed by: ORTHOPAEDIC SURGERY

## 2022-03-21 RX ADMIN — TRIAMCINOLONE ACETONIDE 40 MG: 40 INJECTION, SUSPENSION INTRA-ARTICULAR; INTRAMUSCULAR at 02:03

## 2022-03-23 RX ORDER — TRIAMCINOLONE ACETONIDE 40 MG/ML
40 INJECTION, SUSPENSION INTRA-ARTICULAR; INTRAMUSCULAR
Status: DISCONTINUED | OUTPATIENT
Start: 2022-03-21 | End: 2022-03-23 | Stop reason: HOSPADM

## 2022-03-23 NOTE — PROCEDURES
Small Joint Aspiration/Injection    Date/Time: 3/21/2022 2:00 PM  Performed by: Kirk Morgan MD  Authorized by: Kirk Morgan MD     Consent Done?:  Yes (Verbal)  Indications:  Pain  Site marked: the procedure site was marked    Timeout: prior to procedure the correct patient, procedure, and site was verified    Local anesthetic:  Topical anesthetic  Location:  Thumb  Thumb joint: Left thumb CMC joint.  Ultrasonic guidance for needle placement?: No    Needle size:  25 G  Medications:  40 mg triamcinolone acetonide 40 mg/mL; 40 mg triamcinolone acetonide 40 mg/mL (20 mg injected)  Patient tolerance:  Patient tolerated the procedure well with no immediate complications

## 2022-05-11 ENCOUNTER — OFFICE VISIT (OUTPATIENT)
Dept: URGENT CARE | Facility: CLINIC | Age: 67
End: 2022-05-11
Payer: MEDICARE

## 2022-05-11 VITALS
WEIGHT: 124 LBS | RESPIRATION RATE: 16 BRPM | BODY MASS INDEX: 24.35 KG/M2 | HEIGHT: 60 IN | SYSTOLIC BLOOD PRESSURE: 122 MMHG | OXYGEN SATURATION: 98 % | HEART RATE: 84 BPM | DIASTOLIC BLOOD PRESSURE: 74 MMHG

## 2022-05-11 DIAGNOSIS — S82.61XA CLOSED AVULSION FRACTURE OF LATERAL MALLEOLUS OF RIGHT FIBULA, INITIAL ENCOUNTER: Primary | ICD-10-CM

## 2022-05-11 DIAGNOSIS — R52 PAIN: ICD-10-CM

## 2022-05-11 PROCEDURE — 73630 XR FOOT COMPLETE 3 VIEW RIGHT: ICD-10-PCS | Mod: RT,S$GLB,, | Performed by: RADIOLOGY

## 2022-05-11 PROCEDURE — 99203 PR OFFICE/OUTPT VISIT, NEW, LEVL III, 30-44 MIN: ICD-10-PCS | Mod: S$GLB,,, | Performed by: NURSE PRACTITIONER

## 2022-05-11 PROCEDURE — 1160F PR REVIEW ALL MEDS BY PRESCRIBER/CLIN PHARMACIST DOCUMENTED: ICD-10-PCS | Mod: CPTII,S$GLB,, | Performed by: NURSE PRACTITIONER

## 2022-05-11 PROCEDURE — 73610 XR ANKLE COMPLETE 3 VIEW RIGHT: ICD-10-PCS | Mod: RT,S$GLB,, | Performed by: RADIOLOGY

## 2022-05-11 PROCEDURE — 3074F PR MOST RECENT SYSTOLIC BLOOD PRESSURE < 130 MM HG: ICD-10-PCS | Mod: CPTII,S$GLB,, | Performed by: NURSE PRACTITIONER

## 2022-05-11 PROCEDURE — 3078F PR MOST RECENT DIASTOLIC BLOOD PRESSURE < 80 MM HG: ICD-10-PCS | Mod: CPTII,S$GLB,, | Performed by: NURSE PRACTITIONER

## 2022-05-11 PROCEDURE — 1160F RVW MEDS BY RX/DR IN RCRD: CPT | Mod: CPTII,S$GLB,, | Performed by: NURSE PRACTITIONER

## 2022-05-11 PROCEDURE — 73610 X-RAY EXAM OF ANKLE: CPT | Mod: RT,S$GLB,, | Performed by: RADIOLOGY

## 2022-05-11 PROCEDURE — 3008F PR BODY MASS INDEX (BMI) DOCUMENTED: ICD-10-PCS | Mod: CPTII,S$GLB,, | Performed by: NURSE PRACTITIONER

## 2022-05-11 PROCEDURE — 3008F BODY MASS INDEX DOCD: CPT | Mod: CPTII,S$GLB,, | Performed by: NURSE PRACTITIONER

## 2022-05-11 PROCEDURE — 1159F MED LIST DOCD IN RCRD: CPT | Mod: CPTII,S$GLB,, | Performed by: NURSE PRACTITIONER

## 2022-05-11 PROCEDURE — 1125F PR PAIN SEVERITY QUANTIFIED, PAIN PRESENT: ICD-10-PCS | Mod: CPTII,S$GLB,, | Performed by: NURSE PRACTITIONER

## 2022-05-11 PROCEDURE — 1159F PR MEDICATION LIST DOCUMENTED IN MEDICAL RECORD: ICD-10-PCS | Mod: CPTII,S$GLB,, | Performed by: NURSE PRACTITIONER

## 2022-05-11 PROCEDURE — 3074F SYST BP LT 130 MM HG: CPT | Mod: CPTII,S$GLB,, | Performed by: NURSE PRACTITIONER

## 2022-05-11 PROCEDURE — 73630 X-RAY EXAM OF FOOT: CPT | Mod: RT,S$GLB,, | Performed by: RADIOLOGY

## 2022-05-11 PROCEDURE — 1125F AMNT PAIN NOTED PAIN PRSNT: CPT | Mod: CPTII,S$GLB,, | Performed by: NURSE PRACTITIONER

## 2022-05-11 PROCEDURE — 3078F DIAST BP <80 MM HG: CPT | Mod: CPTII,S$GLB,, | Performed by: NURSE PRACTITIONER

## 2022-05-11 PROCEDURE — 99203 OFFICE O/P NEW LOW 30 MIN: CPT | Mod: S$GLB,,, | Performed by: NURSE PRACTITIONER

## 2022-05-11 NOTE — PROGRESS NOTES
Subjective:       Patient ID: Irish Felder is a 66 y.o. female.    Vitals:  height is 5' (1.524 m) and weight is 56.2 kg (124 lb). Her blood pressure is 122/74 and her pulse is 84. Her respiration is 16 and oxygen saturation is 98%.     Chief Complaint: Ankle Injury    Pt presents with an injury to right ankle. There is pain and swelling. Put ice on it. Pt tripped on rug at about 6:30 Pain scale 6/10 and 10/10 when palpitated. OTC taken, but took a tramadol for another issue.       Provider note begins below:  Patient reports that she tripped on a rug approximately one hour PTA and rolled her right ankle inward. Reports right ankle pain and swelling. Denies LOC or head injury. Reports that she took Tramadol PTA for pain. Unable to bear weight.    Ankle Injury   The incident occurred less than 1 hour ago. The incident occurred at home. The injury mechanism was a twisting injury. The pain is present in the right ankle. The pain is at a severity of 6/10. The pain is moderate. The pain has been constant since onset. Associated symptoms include an inability to bear weight, a loss of motion and numbness. She reports no foreign bodies present. The symptoms are aggravated by palpation, weight bearing and movement. She has tried ice for the symptoms. The treatment provided no relief.       Constitution: Negative. Negative for chills, sweating and fatigue.   HENT: Negative.  Negative for ear pain, facial swelling, congestion and sore throat.    Neck: Negative for painful lymph nodes.   Cardiovascular: Negative.  Negative for chest trauma, chest pain and sob on exertion.   Eyes: Negative.  Negative for eye itching and eye pain.   Respiratory: Negative.  Negative for chest tightness, cough and asthma.    Gastrointestinal: Negative.  Negative for nausea, vomiting and diarrhea.   Endocrine: negative. cold intolerance and excessive thirst.   Genitourinary: Negative.  Negative for dysuria, frequency, urgency and hematuria.    Musculoskeletal: Positive for pain, trauma, joint pain and joint swelling.   Skin: Negative.  Negative for rash, wound and hives.   Allergic/Immunologic: Negative.  Negative for eczema, asthma, hives and itching.   Neurological: Positive for numbness. Negative for disorientation and altered mental status.   Hematologic/Lymphatic: Negative.  Negative for swollen lymph nodes.   Psychiatric/Behavioral: Negative.  Negative for altered mental status, disorientation and confusion.       Objective:      Physical Exam   Constitutional: She is oriented to person, place, and time. She appears well-developed. She is cooperative.  Non-toxic appearance. She does not appear ill. No distress.   HENT:   Head: Normocephalic and atraumatic. Head is without abrasion, without contusion and without laceration.   Ears:   Right Ear: Hearing, tympanic membrane, external ear and ear canal normal. No hemotympanum.   Left Ear: Hearing, tympanic membrane, external ear and ear canal normal. No hemotympanum.   Nose: Nose normal. No mucosal edema, rhinorrhea or nasal deformity. No epistaxis. Right sinus exhibits no maxillary sinus tenderness and no frontal sinus tenderness. Left sinus exhibits no maxillary sinus tenderness and no frontal sinus tenderness.   Mouth/Throat: Uvula is midline, oropharynx is clear and moist and mucous membranes are normal. No trismus in the jaw. Normal dentition. No uvula swelling. No posterior oropharyngeal erythema.   Eyes: Conjunctivae, EOM and lids are normal. Pupils are equal, round, and reactive to light. Right eye exhibits no discharge. Left eye exhibits no discharge. No scleral icterus.   Neck: Trachea normal and phonation normal. Neck supple. No tracheal deviation present. No neck rigidity present. No spinous process tenderness present. No muscular tenderness present.   Cardiovascular: Normal rate, regular rhythm, normal heart sounds and normal pulses.   Pulmonary/Chest: Effort normal and breath sounds  normal. No respiratory distress.   Abdominal: Normal appearance and bowel sounds are normal. She exhibits no distension, no pulsatile midline mass and no mass. Soft. There is no abdominal tenderness.   Musculoskeletal:         General: Swelling, tenderness and signs of injury present. No deformity.      Right ankle: She exhibits decreased range of motion and swelling. Tenderness. Lateral malleolus and medial malleolus tenderness found. Achilles tendon normal.        Legs:       Comments: 3+ DP/PT pulses, sensation intact, limited ROM.  Cap refill < 2 sec.   Neurological: no focal deficit. She is alert and oriented to person, place, and time. She has normal strength. No cranial nerve deficit or sensory deficit. She exhibits normal muscle tone. She displays no seizure activity. Coordination normal. GCS eye subscore is 4. GCS verbal subscore is 5. GCS motor subscore is 6.   Skin: Skin is warm, dry, intact, not diaphoretic and not pale. Capillary refill takes less than 2 seconds. No abrasion, No burn, No bruising and No ecchymosis   Psychiatric: Her speech is normal and behavior is normal. Mood, judgment and thought content normal.   Nursing note and vitals reviewed.      IMAGING-  XR ANKLE COMPLETE 3 VIEW RIGHT    Addendum Date: 5/11/2022    This is a correction of this report which describes soft tissue swelling and chip avulsion fracture over the medial malleolus. There is soft tissue swelling is seen over the LATERAL malleolus with LATERAL malleolar acute chip avulsion fracture noted. Electronically signed by: Mckenzie Parham Date:    05/11/2022 Time:    19:44    Result Date: 5/11/2022  EXAMINATION: XR ANKLE COMPLETE 3 VIEW RIGHT CLINICAL HISTORY: Pain, unspecified TECHNIQUE: AP, lateral, and oblique images of the right ankle were performed. COMPARISON: None FINDINGS: Soft tissue swelling is noted over the medial malleolus.  There is a linear 1 x 3 mm chip avulsion type fracture medial to medial malleolus.  Ankle  mortise appears well maintained with no evidence of acute fractures otherwise.  The subtalar joint is intact. Incidental small focal area of sclerosis possibly representative of and intra osseous lipoma overlying the mid calcaneal body noted.  Calcaneal tiny enthesophytes are also noted.     Small acute chip avulsion type fracture involving the medial malleolus with overlying soft tissue swelling. Electronically signed by: Mckenzie Parham Date:    05/11/2022 Time:    19:38    XR FOOT COMPLETE 3 VIEW RIGHT    Result Date: 5/11/2022  EXAMINATION: XR FOOT COMPLETE 3 VIEW RIGHT CLINICAL HISTORY: . Pain, unspecified TECHNIQUE: AP, lateral, and oblique views of the right foot were performed. COMPARISON: Right ankle views also obtained 05/11/2022 FINDINGS: There is no evidence of acute fracture or dislocation involving the right foot osseous structures. There is hallux valgus deformity of the great toe and mild degenerative changes at the great toe MTP joint with medial soft tissue swelling also noted.  No radiopaque foreign bodies are seen in the soft tissues.  Soft tissue swelling also is noted over the lateral malleolus. There is a accessory ossicle medial to the talonavicular joint.  Incidental sclerotic focus over the mid calcaneus again noted as described on prior report possibly representing intra osseous lipoma.  Small plantar calcaneal enthesophyte also noted.     No acute abnormality involving the right foot osseous structures as imaged. Great toe hallux valgus deformity and degenerative changes. Incidental findings as described above. Electronically signed by: Mckenzie Parham Date:    05/11/2022 Time:    19:46        Assessment:       1. Closed avulsion fracture of lateral malleolus of right fibula, initial encounter    2. Pain          Plan:       FOLLOWUP  Follow up if symptoms worsen or fail to improve, for PLEASE CONTACT PCP OR CONTACT THE EMERGENCY ROOM..     PATIENT INSTRUCTIONS  Patient Instructions    Follow-up with orthopedics as referred.        INSTRUCTIONS:  - Rest.  - Drink plenty of fluids.  - Take Tylenol and/or Ibuprofen as directed as needed for fever/pain.  Do not take more than the recommended dose.  - follow up with your PCP within the next 1-2 weeks as needed.  - You must understand that you have received an Urgent Care treatment only and that you may be released before all of your medical problems are known or treated.   - You, the patient, will arrange for follow up care as instructed.   - If your condition worsens or fails to improve we recommend that you receive another evaluation at the ER immediately or contact your PCP to discuss your concerns.   - You can call [phone] or [phone] to help schedule an appointment with the appropriate provider.              THANK YOU FOR ALLOWING ME TO PARTICIPATE IN YOUR HEALTHCARE,     Tiit Daniels NP   Closed avulsion fracture of lateral malleolus of right fibula, initial encounter  -     NON-PNEUMATIC WALKING BOOT FOR HOME USE  -     CRUTCHES FOR HOME USE  -     Ambulatory referral/consult to Orthopedics    Pain  -     XR ANKLE COMPLETE 3 VIEW RIGHT; Future; Expected date: 05/11/2022  -     XR FOOT COMPLETE 3 VIEW RIGHT; Future; Expected date: 05/11/2022

## 2022-05-12 NOTE — PATIENT INSTRUCTIONS
Follow-up with orthopedics as referred.        INSTRUCTIONS:  - Rest.  - Drink plenty of fluids.  - Take Tylenol and/or Ibuprofen as directed as needed for fever/pain.  Do not take more than the recommended dose.  - follow up with your PCP within the next 1-2 weeks as needed.  - You must understand that you have received an Urgent Care treatment only and that you may be released before all of your medical problems are known or treated.   - You, the patient, will arrange for follow up care as instructed.   - If your condition worsens or fails to improve we recommend that you receive another evaluation at the ER immediately or contact your PCP to discuss your concerns.   - You can call [phone] or [phone] to help schedule an appointment with the appropriate provider.

## 2022-05-13 ENCOUNTER — OFFICE VISIT (OUTPATIENT)
Dept: ORTHOPEDICS | Facility: CLINIC | Age: 67
End: 2022-05-13
Payer: MEDICARE

## 2022-05-13 ENCOUNTER — TELEPHONE (OUTPATIENT)
Dept: ORTHOPEDICS | Facility: CLINIC | Age: 67
End: 2022-05-13

## 2022-05-13 VITALS — HEIGHT: 60 IN | WEIGHT: 124 LBS | BODY MASS INDEX: 24.35 KG/M2

## 2022-05-13 DIAGNOSIS — M25.571 RIGHT ANKLE PAIN: ICD-10-CM

## 2022-05-13 DIAGNOSIS — S82.831A CLOSED FRACTURE OF DISTAL END OF RIGHT FIBULA, UNSPECIFIED FRACTURE MORPHOLOGY, INITIAL ENCOUNTER: ICD-10-CM

## 2022-05-13 DIAGNOSIS — M25.571 RIGHT ANKLE PAIN, UNSPECIFIED CHRONICITY: ICD-10-CM

## 2022-05-13 DIAGNOSIS — S82.63XA: Primary | ICD-10-CM

## 2022-05-13 PROCEDURE — 97760 PR ORTHOTIC MGMT&TRAINJ INITIAL ENC EA 15 MINS: ICD-10-PCS | Mod: GP,S$GLB,, | Performed by: ORTHOPAEDIC SURGERY

## 2022-05-13 PROCEDURE — 3288F FALL RISK ASSESSMENT DOCD: CPT | Mod: CPTII,S$GLB,, | Performed by: ORTHOPAEDIC SURGERY

## 2022-05-13 PROCEDURE — 99204 PR OFFICE/OUTPT VISIT, NEW, LEVL IV, 45-59 MIN: ICD-10-PCS | Mod: 57,S$GLB,, | Performed by: ORTHOPAEDIC SURGERY

## 2022-05-13 PROCEDURE — 99999 PR PBB SHADOW E&M-EST. PATIENT-LVL III: CPT | Mod: PBBFAC,,, | Performed by: ORTHOPAEDIC SURGERY

## 2022-05-13 PROCEDURE — 1101F PR PT FALLS ASSESS DOC 0-1 FALLS W/OUT INJ PAST YR: ICD-10-PCS | Mod: CPTII,S$GLB,, | Performed by: ORTHOPAEDIC SURGERY

## 2022-05-13 PROCEDURE — 1125F PR PAIN SEVERITY QUANTIFIED, PAIN PRESENT: ICD-10-PCS | Mod: CPTII,S$GLB,, | Performed by: ORTHOPAEDIC SURGERY

## 2022-05-13 PROCEDURE — 99999 PR PBB SHADOW E&M-EST. PATIENT-LVL III: ICD-10-PCS | Mod: PBBFAC,,, | Performed by: ORTHOPAEDIC SURGERY

## 2022-05-13 PROCEDURE — 27786 TREATMENT OF ANKLE FRACTURE: CPT | Mod: RT,S$GLB,, | Performed by: ORTHOPAEDIC SURGERY

## 2022-05-13 PROCEDURE — 1159F MED LIST DOCD IN RCRD: CPT | Mod: CPTII,S$GLB,, | Performed by: ORTHOPAEDIC SURGERY

## 2022-05-13 PROCEDURE — 99204 OFFICE O/P NEW MOD 45 MIN: CPT | Mod: 57,S$GLB,, | Performed by: ORTHOPAEDIC SURGERY

## 2022-05-13 PROCEDURE — 3008F BODY MASS INDEX DOCD: CPT | Mod: CPTII,S$GLB,, | Performed by: ORTHOPAEDIC SURGERY

## 2022-05-13 PROCEDURE — 1159F PR MEDICATION LIST DOCUMENTED IN MEDICAL RECORD: ICD-10-PCS | Mod: CPTII,S$GLB,, | Performed by: ORTHOPAEDIC SURGERY

## 2022-05-13 PROCEDURE — 1160F RVW MEDS BY RX/DR IN RCRD: CPT | Mod: CPTII,S$GLB,, | Performed by: ORTHOPAEDIC SURGERY

## 2022-05-13 PROCEDURE — 3008F PR BODY MASS INDEX (BMI) DOCUMENTED: ICD-10-PCS | Mod: CPTII,S$GLB,, | Performed by: ORTHOPAEDIC SURGERY

## 2022-05-13 PROCEDURE — 97760 ORTHOTIC MGMT&TRAING 1ST ENC: CPT | Mod: GP,S$GLB,, | Performed by: ORTHOPAEDIC SURGERY

## 2022-05-13 PROCEDURE — 1101F PT FALLS ASSESS-DOCD LE1/YR: CPT | Mod: CPTII,S$GLB,, | Performed by: ORTHOPAEDIC SURGERY

## 2022-05-13 PROCEDURE — 3288F PR FALLS RISK ASSESSMENT DOCUMENTED: ICD-10-PCS | Mod: CPTII,S$GLB,, | Performed by: ORTHOPAEDIC SURGERY

## 2022-05-13 PROCEDURE — 1125F AMNT PAIN NOTED PAIN PRSNT: CPT | Mod: CPTII,S$GLB,, | Performed by: ORTHOPAEDIC SURGERY

## 2022-05-13 PROCEDURE — 1160F PR REVIEW ALL MEDS BY PRESCRIBER/CLIN PHARMACIST DOCUMENTED: ICD-10-PCS | Mod: CPTII,S$GLB,, | Performed by: ORTHOPAEDIC SURGERY

## 2022-05-13 PROCEDURE — 27786 PR CLOSED RX DIST FIBULA FX: ICD-10-PCS | Mod: RT,S$GLB,, | Performed by: ORTHOPAEDIC SURGERY

## 2022-05-13 NOTE — TELEPHONE ENCOUNTER
----- Message from Carmelita Reyes sent at 5/13/2022 11:09 AM CDT -----      Name of Who is Calling: BETY FERNANDEZ [[id number]]      What is the request in detail: Pt called said her car wouldn't start but is on the way she's 5min away.Please contact to further discuss and advise.        Can the clinic reply by [facility]: N      What Number to Call Back if not [facility]: [phone]

## 2022-05-13 NOTE — PROGRESS NOTES
66 years old twisted her ankle when trying to stand up from a seated position lateral ankle pain diagnosed with avulsion fracture comes in with boot for definitive treatment    Exam shows tenderness at the distal fibula nontender medially nontender 5th metatarsal, Achilles tendon is intact    X-rays show avulsion fracture distal fibula    Assessment:  Right distal fibula fracture    Plan:  Boot, weight-bearing to tolerance, gentle daily range of motion, will also get her fitted with a stirrup splint, follow-up in about a month's time is a postoperative visit with x-rays of her right ankle    We performed a custom orthotic/brace fitting, adjusting and training with the patient. The patient demonstrated understanding and proper care. This was performed for 15 minutes.    Imaging studies ordered and reviewed by me    Further History  Aching pain  Worse with activity  Relieved with rest  No other associated symptoms  No other radiation    Further Exam  Alert and oriented  Pleasant  Contralateral limb has appropriate range of motion for age and condition  Contralateral limb has appropriate strength for age and condition  Contralateral limb has appropriate stability  for age and condition  No adenopathy  Pulses are appropriate for current condition  Skin is intact        Chief Complaint    Chief Complaint   Patient presents with    Right Ankle - Pain, Injury, Swelling       HPI  Irish Felder is a 66 y.o.  female who presents with       Past Medical History  Past Medical History:   Diagnosis Date    Anemia     Cancer     skin cancer - nose    Chest pain 2016    GERD (gastroesophageal reflux disease)     Hyperlipidemia     Seasonal allergies        Past Surgical History  Past Surgical History:   Procedure Laterality Date    AUGMENTATION OF BREAST Bilateral 2010    BACK SURGERY  09/2017    Cyst removed between L4-L5, Dr. Mercedes    BACK SURGERY  08/10/2020    L4-5 fusion, laminectomy    BREAST SURGERY Bilateral      2010    CATARACT EXTRACTION EXTRACAPSULAR W/ INTRAOCULAR LENS IMPLANTATION Bilateral 2012    CHOLECYSTECTOMY      COLONOSCOPY      EYE SURGERY Right 2014    retina surgery    FOOT GANGLION EXCISION Left 12/9/2019    Procedure: EXCISION, GANGLION CYST, FOOT;  Surgeon: Shawn Bañuelos MD;  Location: [facility] OR;  Service: Orthopedics;  Laterality: Left;    HERNIA REPAIR      x2    HYSTERECTOMY  1985    LASIK Bilateral 2004    OU    LUMBAR DISC SURGERY  08/2020    L3-4     OOPHORECTOMY  1985    TENDON RELEASE Right 2009    right heel    TONSILLECTOMY      TOTAL REDUCTION MAMMOPLASTY Bilateral 1983    TUMOR EXCISION Left     Below shoulder    UPPER GASTROINTESTINAL ENDOSCOPY         Medications  Current Outpatient Medications   Medication Sig    AMITIZA 24 mcg Cap TK 1 C PO BID    ARMOUR THYROID 60 mg Tab TK 1 T PO D    aspirin (ECOTRIN) 81 MG EC tablet Take 81 mg by mouth once daily.    betamethasone dipropionate (DIPROLENE) 0.05 % lotion     calcium carbonate (TUMS) 300 mg (750 mg) Chew Take by mouth as needed.    chlorhexidine (PERIDEX) 0.12 % solution SWISH AND SPIT WITH 1 CAPFUL THREE TIMES DAILY AS DIRECTED. MAY STAIN TEETH    diclofenac sodium (VOLTAREN) 1 % Gel Apply 2 g topically 4 (four) times daily.    ezetimibe (ZETIA) 10 mg tablet     FLUoxetine (PROZAC) 20 MG capsule Take 20 mg by mouth Every 3 (three) days.    fluticasone propionate (FLONASE) 50 mcg/actuation nasal spray SHAKE LQ AND U 1 SPR IEN QD    hyoscyamine (ANASPAZ,LEVSIN) 0.125 mg Tab TK 1 T PO Q 4 H PRN    lidocaine-prilocaine (EMLA) cream APPLY 1-2 GRAMS TO THE AFFECTED AREAS 3-4 TIMES DAILY STARTING 6 WEEKS POST SURGERY.    multivitamin capsule Take 1 capsule by mouth once daily.    nitroGLYCERIN (NITROSTAT) 0.4 MG SL tablet TK ONE T PO PRN    RESTASIS 0.05 % ophthalmic emulsion INT 1 GTT IN EACH EYE BID    rosuvastatin (CRESTOR) 40 MG Tab TK 1 T PO QD    SANARE ADV SCAR THERAPY BASE Gel APPLY 1/2 GRAM (1  PUMP) TO AFFECTED AREAS TWICE DAILY STARTING 6 WEEKS POST SURGERY.    traMADol (ULTRAM) 50 mg tablet Take 50 mg by mouth every 8 (eight) hours as needed.     trazodone (DESYREL) 50 MG tablet Take 50 mg by mouth every evening. Takes 1/2 tablet HS    valACYclovir (VALTREX) 1000 MG tablet      No current facility-administered medications for this visit.       Allergies  Review of patient's allergies indicates:  No Known Allergies    Family History  Family History   Problem Relation Age of Onset    Cataracts Mother     Cancer Mother         lung    Diabetes Father     Cancer Father         lung    Cataracts Sister        Social History  Social History     Socioeconomic History    Marital status:    Tobacco Use    Smoking status: Never Smoker    Smokeless tobacco: Never Used   Substance and Sexual Activity    Alcohol use: Not Currently     Comment: socially     Drug use: No               Review of Systems     Constitutional: Negative    HENT: Negative  Eyes: Negative  Respiratory: Negative  Cardiovascular: Negative  Musculoskeletal: HPI  Skin: Negative  Neurological: Negative  Hematological: Negative  Endocrine: Negative                 Physical Exam    There were no vitals filed for this visit.  Body mass index is 24.22 kg/m².  Physical Examination:     General appearance -  well appearing, and in no distress  Mental status - awake  Neck - supple  Chest -  symmetric air entry  Heart - normal rate   Abdomen - soft      Assessment     1. Closed fracture of lateral malleolus    2. Right ankle pain    3. Right ankle pain, unspecified chronicity    4. Closed fracture of distal end of right fibula, unspecified fracture morphology, initial encounter          Plan

## 2022-06-05 DIAGNOSIS — S82.63XA: Primary | ICD-10-CM

## 2022-06-06 ENCOUNTER — HOSPITAL ENCOUNTER (OUTPATIENT)
Dept: RADIOLOGY | Facility: HOSPITAL | Age: 67
Discharge: HOME | End: 2022-06-06
Attending: ORTHOPAEDIC SURGERY
Payer: MEDICARE

## 2022-06-06 ENCOUNTER — OFFICE VISIT (OUTPATIENT)
Dept: ORTHOPEDICS | Facility: CLINIC | Age: 67
End: 2022-06-06
Payer: MEDICARE

## 2022-06-06 VITALS — HEIGHT: 60 IN | BODY MASS INDEX: 24.35 KG/M2 | WEIGHT: 124 LBS

## 2022-06-06 DIAGNOSIS — S82.63XA: ICD-10-CM

## 2022-06-06 DIAGNOSIS — M25.571 ACUTE RIGHT ANKLE PAIN: Primary | ICD-10-CM

## 2022-06-06 PROCEDURE — 1125F AMNT PAIN NOTED PAIN PRSNT: CPT | Mod: CPTII,S$GLB,, | Performed by: ORTHOPAEDIC SURGERY

## 2022-06-06 PROCEDURE — 1100F PR PT FALLS ASSESS DOC 2+ FALLS/FALL W/INJURY/YR: ICD-10-PCS | Mod: CPTII,S$GLB,, | Performed by: ORTHOPAEDIC SURGERY

## 2022-06-06 PROCEDURE — 73610 X-RAY EXAM OF ANKLE: CPT | Mod: 26,RT,, | Performed by: RADIOLOGY

## 2022-06-06 PROCEDURE — 73610 XR ANKLE COMPLETE 3 VIEW RIGHT: ICD-10-PCS | Mod: 26,RT,, | Performed by: RADIOLOGY

## 2022-06-06 PROCEDURE — 1125F PR PAIN SEVERITY QUANTIFIED, PAIN PRESENT: ICD-10-PCS | Mod: CPTII,S$GLB,, | Performed by: ORTHOPAEDIC SURGERY

## 2022-06-06 PROCEDURE — 73610 X-RAY EXAM OF ANKLE: CPT | Mod: TC,PO,RT

## 2022-06-06 PROCEDURE — 3288F FALL RISK ASSESSMENT DOCD: CPT | Mod: CPTII,S$GLB,, | Performed by: ORTHOPAEDIC SURGERY

## 2022-06-06 PROCEDURE — 1159F PR MEDICATION LIST DOCUMENTED IN MEDICAL RECORD: ICD-10-PCS | Mod: CPTII,S$GLB,, | Performed by: ORTHOPAEDIC SURGERY

## 2022-06-06 PROCEDURE — 99024 PR POST-OP FOLLOW-UP VISIT: ICD-10-PCS | Mod: S$GLB,,, | Performed by: ORTHOPAEDIC SURGERY

## 2022-06-06 PROCEDURE — 1159F MED LIST DOCD IN RCRD: CPT | Mod: CPTII,S$GLB,, | Performed by: ORTHOPAEDIC SURGERY

## 2022-06-06 PROCEDURE — 99024 POSTOP FOLLOW-UP VISIT: CPT | Mod: S$GLB,,, | Performed by: ORTHOPAEDIC SURGERY

## 2022-06-06 PROCEDURE — 99999 PR PBB SHADOW E&M-EST. PATIENT-LVL III: ICD-10-PCS | Mod: PBBFAC,,, | Performed by: ORTHOPAEDIC SURGERY

## 2022-06-06 PROCEDURE — 3008F BODY MASS INDEX DOCD: CPT | Mod: CPTII,S$GLB,, | Performed by: ORTHOPAEDIC SURGERY

## 2022-06-06 PROCEDURE — 99999 PR PBB SHADOW E&M-EST. PATIENT-LVL III: CPT | Mod: PBBFAC,,, | Performed by: ORTHOPAEDIC SURGERY

## 2022-06-06 PROCEDURE — 1100F PTFALLS ASSESS-DOCD GE2>/YR: CPT | Mod: CPTII,S$GLB,, | Performed by: ORTHOPAEDIC SURGERY

## 2022-06-06 PROCEDURE — 3288F PR FALLS RISK ASSESSMENT DOCUMENTED: ICD-10-PCS | Mod: CPTII,S$GLB,, | Performed by: ORTHOPAEDIC SURGERY

## 2022-06-06 PROCEDURE — 3008F PR BODY MASS INDEX (BMI) DOCUMENTED: ICD-10-PCS | Mod: CPTII,S$GLB,, | Performed by: ORTHOPAEDIC SURGERY

## 2022-06-06 NOTE — PROGRESS NOTES
Close to month out from ankle injury, fibula avulsion distally, is improving slightly able to walk without assistive devices but uncomfortable    Exam shows no signs infection instability walks with a limp    X-rays look good    Plan:  Continue to progress activities tolerance, she requested an ankle compression device, follow-up in 1 months time no x-rays needed

## 2022-07-07 ENCOUNTER — OFFICE VISIT (OUTPATIENT)
Dept: ORTHOPEDICS | Facility: CLINIC | Age: 67
End: 2022-07-07
Payer: MEDICARE

## 2022-07-07 VITALS — WEIGHT: 124 LBS | BODY MASS INDEX: 24.35 KG/M2 | HEIGHT: 60 IN

## 2022-07-07 DIAGNOSIS — M25.571 ACUTE RIGHT ANKLE PAIN: Primary | ICD-10-CM

## 2022-07-07 PROCEDURE — 3008F PR BODY MASS INDEX (BMI) DOCUMENTED: ICD-10-PCS | Mod: CPTII,S$GLB,, | Performed by: ORTHOPAEDIC SURGERY

## 2022-07-07 PROCEDURE — 1101F PT FALLS ASSESS-DOCD LE1/YR: CPT | Mod: CPTII,S$GLB,, | Performed by: ORTHOPAEDIC SURGERY

## 2022-07-07 PROCEDURE — 99999 PR PBB SHADOW E&M-EST. PATIENT-LVL III: CPT | Mod: PBBFAC,,, | Performed by: ORTHOPAEDIC SURGERY

## 2022-07-07 PROCEDURE — 99024 PR POST-OP FOLLOW-UP VISIT: ICD-10-PCS | Mod: S$GLB,,, | Performed by: ORTHOPAEDIC SURGERY

## 2022-07-07 PROCEDURE — 1101F PR PT FALLS ASSESS DOC 0-1 FALLS W/OUT INJ PAST YR: ICD-10-PCS | Mod: CPTII,S$GLB,, | Performed by: ORTHOPAEDIC SURGERY

## 2022-07-07 PROCEDURE — 1125F PR PAIN SEVERITY QUANTIFIED, PAIN PRESENT: ICD-10-PCS | Mod: CPTII,S$GLB,, | Performed by: ORTHOPAEDIC SURGERY

## 2022-07-07 PROCEDURE — 3288F PR FALLS RISK ASSESSMENT DOCUMENTED: ICD-10-PCS | Mod: CPTII,S$GLB,, | Performed by: ORTHOPAEDIC SURGERY

## 2022-07-07 PROCEDURE — 3008F BODY MASS INDEX DOCD: CPT | Mod: CPTII,S$GLB,, | Performed by: ORTHOPAEDIC SURGERY

## 2022-07-07 PROCEDURE — 1125F AMNT PAIN NOTED PAIN PRSNT: CPT | Mod: CPTII,S$GLB,, | Performed by: ORTHOPAEDIC SURGERY

## 2022-07-07 PROCEDURE — 99024 POSTOP FOLLOW-UP VISIT: CPT | Mod: S$GLB,,, | Performed by: ORTHOPAEDIC SURGERY

## 2022-07-07 PROCEDURE — 99999 PR PBB SHADOW E&M-EST. PATIENT-LVL III: ICD-10-PCS | Mod: PBBFAC,,, | Performed by: ORTHOPAEDIC SURGERY

## 2022-07-07 PROCEDURE — 3288F FALL RISK ASSESSMENT DOCD: CPT | Mod: CPTII,S$GLB,, | Performed by: ORTHOPAEDIC SURGERY

## 2022-07-07 PROCEDURE — 1159F MED LIST DOCD IN RCRD: CPT | Mod: CPTII,S$GLB,, | Performed by: ORTHOPAEDIC SURGERY

## 2022-07-07 PROCEDURE — 1159F PR MEDICATION LIST DOCUMENTED IN MEDICAL RECORD: ICD-10-PCS | Mod: CPTII,S$GLB,, | Performed by: ORTHOPAEDIC SURGERY

## 2022-07-07 RX ORDER — ROSUVASTATIN CALCIUM 20 MG/1
20 TABLET, COATED ORAL DAILY
COMMUNITY
Start: 2022-05-04

## 2022-07-07 NOTE — PROGRESS NOTES
66 years old 2 months out from right ankle injury, distal fibula avulsion injury still reporting to have pain 8 on the pain scale    Exam shows no swelling or signs of injury infection, good motion strength, does have some discomfort with inversion points to the lateral side ankles location for pain    Plan:  Continue with soft ankle brace and/or boot as needed for comfort, continued range of motion exercise, will get set physical therapy, follow up prior to departure to [place]

## 2022-08-05 NOTE — TELEPHONE ENCOUNTER
----- Message from Vazquez Huff sent at 6/20/2017  2:19 PM CDT -----  Contact: john Chavarria/ [place] [facility]  She's calling in regards to a peer to peer for this pt, please advise, ph# [phone]  
Time has been set up for peer to peer Tues June 27 3:00 pm to call [phone] Deena with medicaid  
05-Aug-2022

## 2024-03-29 NOTE — PROGRESS NOTES
Ms Felder returns to clinic today.  She has a history of bilateral thumb CMC arthritis.  She states that this time the left thumb is giving her the most pain.  She is here today for discussion of treatment options    Physical exam:  Examination left hand reveals that there is no edema.  She does have changes consistent with arthritis of the CMC joint.  Palpation in that area does produce tenderness.  She does have pain with grind testing.  She does have a 2+ radial pulse and sensation is intact    Assessment:  Left thumb CMC arthritis    Plan:    1. After informed consent was obtained injection was placed to the left thumb CMC joint.  The patient tolerated that well.    2. Follow up with me on a p.r.n. basis.  I did discuss surgery with the patient and she states that she may consider it after the summer    
No

## 2024-05-03 ENCOUNTER — OFFICE VISIT (OUTPATIENT)
Dept: ORTHOPEDICS | Facility: CLINIC | Age: 69
End: 2024-05-03
Payer: MEDICARE

## 2024-05-03 DIAGNOSIS — M15.1 DEGENERATIVE ARTHRITIS OF DISTAL INTERPHALANGEAL JOINT OF INDEX FINGER OF RIGHT HAND: Primary | ICD-10-CM

## 2024-05-03 PROCEDURE — 1101F PT FALLS ASSESS-DOCD LE1/YR: CPT | Mod: CPTII,S$GLB,, | Performed by: ORTHOPAEDIC SURGERY

## 2024-05-03 PROCEDURE — 1159F MED LIST DOCD IN RCRD: CPT | Mod: CPTII,S$GLB,, | Performed by: ORTHOPAEDIC SURGERY

## 2024-05-03 PROCEDURE — 20600 DRAIN/INJ JOINT/BURSA W/O US: CPT | Mod: RT,S$GLB,, | Performed by: ORTHOPAEDIC SURGERY

## 2024-05-03 PROCEDURE — 99213 OFFICE O/P EST LOW 20 MIN: CPT | Mod: 25,S$GLB,, | Performed by: ORTHOPAEDIC SURGERY

## 2024-05-03 PROCEDURE — 3288F FALL RISK ASSESSMENT DOCD: CPT | Mod: CPTII,S$GLB,, | Performed by: ORTHOPAEDIC SURGERY

## 2024-05-03 PROCEDURE — 1125F AMNT PAIN NOTED PAIN PRSNT: CPT | Mod: CPTII,S$GLB,, | Performed by: ORTHOPAEDIC SURGERY

## 2024-05-03 PROCEDURE — 99999 PR PBB SHADOW E&M-EST. PATIENT-LVL II: CPT | Mod: PBBFAC,,, | Performed by: ORTHOPAEDIC SURGERY

## 2024-05-03 RX ADMIN — TRIAMCINOLONE ACETONIDE 20 MG: 40 INJECTION, SUSPENSION INTRA-ARTICULAR; INTRAMUSCULAR at 11:05

## 2024-05-03 NOTE — PROGRESS NOTES
Ms Felder returns to clinic today.  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  It was now starting cause her worsening of pain and dysfunction.  He was here today to discuss treatment     Physical exam:  Examination of the right index finger reveals that there is obvious deformity at the tip of the finger.  This is consistent with severe arthritis of the distal interphalangeal joint.  Palpation about the joint does produce tenderness.  She does have limitation of flexion and extension of the joint.  She was neurovascularly intact.      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  I have discussed the possibility of arthrodesis of the joint but she does not want to do that at this time.  She may consider it after the summer     2. After consent was obtained injection was placed to the right index finger distal interphalangeal joint    3.  She will follow up with me as needed

## 2024-05-09 PROBLEM — M15.1 DEGENERATIVE ARTHRITIS OF DISTAL INTERPHALANGEAL JOINT OF INDEX FINGER OF RIGHT HAND: Status: ACTIVE | Noted: 2024-05-09

## 2024-05-09 RX ORDER — TRIAMCINOLONE ACETONIDE 40 MG/ML
20 INJECTION, SUSPENSION INTRA-ARTICULAR; INTRAMUSCULAR
Status: DISCONTINUED | OUTPATIENT
Start: 2024-05-03 | End: 2024-05-09 | Stop reason: HOSPADM

## 2024-05-09 NOTE — PROCEDURES
Small Joint Aspiration/Injection: R index DIP    Date/Time: 5/3/2024 11:20 AM    Performed by: Kirk Morgan MD  Authorized by: Kirk Morgan MD    Consent Done?:  Yes (Verbal)  Indications:  Arthritis and pain  Site marked: the procedure site was marked    Prep: patient was prepped and draped in usual sterile fashion      Location:  Index finger  Site:  R index DIP  Needle size:  25 G  Medications:  20 mg triamcinolone acetonide 40 mg/mL

## 2024-11-06 ENCOUNTER — OFFICE VISIT (OUTPATIENT)
Dept: ORTHOPEDICS | Facility: CLINIC | Age: 69
End: 2024-11-06
Payer: MEDICARE

## 2024-11-06 ENCOUNTER — HOSPITAL ENCOUNTER (OUTPATIENT)
Dept: RADIOLOGY | Facility: HOSPITAL | Age: 69
Discharge: HOME | End: 2024-11-06
Attending: ORTHOPAEDIC SURGERY
Payer: MEDICARE

## 2024-11-06 DIAGNOSIS — M15.1 DEGENERATIVE ARTHRITIS OF DISTAL INTERPHALANGEAL JOINT OF INDEX FINGER OF RIGHT HAND: ICD-10-CM

## 2024-11-06 DIAGNOSIS — M15.1 DEGENERATIVE ARTHRITIS OF DISTAL INTERPHALANGEAL JOINT OF INDEX FINGER OF RIGHT HAND: Primary | ICD-10-CM

## 2024-11-06 PROCEDURE — 3288F FALL RISK ASSESSMENT DOCD: CPT | Mod: CPTII,S$GLB,, | Performed by: ORTHOPAEDIC SURGERY

## 2024-11-06 PROCEDURE — 1101F PT FALLS ASSESS-DOCD LE1/YR: CPT | Mod: CPTII,S$GLB,, | Performed by: ORTHOPAEDIC SURGERY

## 2024-11-06 PROCEDURE — 73140 X-RAY EXAM OF FINGER(S): CPT | Mod: TC,PO,RT

## 2024-11-06 PROCEDURE — 3044F HG A1C LEVEL LT 7.0%: CPT | Mod: CPTII,S$GLB,, | Performed by: ORTHOPAEDIC SURGERY

## 2024-11-06 PROCEDURE — 1159F MED LIST DOCD IN RCRD: CPT | Mod: CPTII,S$GLB,, | Performed by: ORTHOPAEDIC SURGERY

## 2024-11-06 PROCEDURE — 99214 OFFICE O/P EST MOD 30 MIN: CPT | Mod: S$GLB,,, | Performed by: ORTHOPAEDIC SURGERY

## 2024-11-06 PROCEDURE — 73140 X-RAY EXAM OF FINGER(S): CPT | Mod: 26,RT,, | Performed by: RADIOLOGY

## 2024-11-06 PROCEDURE — 1125F AMNT PAIN NOTED PAIN PRSNT: CPT | Mod: CPTII,S$GLB,, | Performed by: ORTHOPAEDIC SURGERY

## 2024-11-06 PROCEDURE — 99999 PR PBB SHADOW E&M-EST. PATIENT-LVL III: CPT | Mod: PBBFAC,,, | Performed by: ORTHOPAEDIC SURGERY

## 2024-11-06 NOTE — PROGRESS NOTES
11/6/2024    Chief Complaint:  Chief Complaint   Patient presents with    Left Hand - Pain       HPI:  Irish Felder is a 69 y.o. 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  This has been going on for several years.  It was considerably worse.  She was having worsening of pain.  She also has significant deformity.    PMHX:  Past Medical History:   Diagnosis Date    Anemia     Cancer     skin cancer - nose    Chest pain 2016    GERD (gastroesophageal reflux disease)     Hyperlipidemia     Seasonal allergies        PSHX:  Past Surgical History:   Procedure Laterality Date    AUGMENTATION OF BREAST Bilateral 2010    BACK SURGERY  09/2017    Cyst removed between L4-L5, Dr. Mercedes    BACK SURGERY  08/10/2020    L4-5 fusion, laminectomy    BREAST SURGERY Bilateral     2010    CATARACT EXTRACTION EXTRACAPSULAR W/ INTRAOCULAR LENS IMPLANTATION Bilateral 2012    CHOLECYSTECTOMY      COLONOSCOPY      EYE SURGERY Right 2014    retina surgery    FOOT GANGLION EXCISION Left 12/09/2019    Procedure: EXCISION, GANGLION CYST, FOOT;  Surgeon: Shawn Bañuelos MD;  Location: [facility] OR;  Service: Orthopedics;  Laterality: Left;    HERNIA REPAIR      x2    HYSTERECTOMY  1985    complete    LASIK Bilateral 2004    OU    LUMBAR DISC SURGERY  08/2020    L3-4     OOPHORECTOMY  1985    TENDON RELEASE Right 2009    right heel    TONSILLECTOMY      TOTAL REDUCTION MAMMOPLASTY Bilateral 1983    TUMOR EXCISION Left     Below shoulder    UPPER GASTROINTESTINAL ENDOSCOPY         FMHX:  Family History   Problem Relation Name Age of Onset    Cataracts Mother      Lung cancer Mother      Lung cancer Father      Diabetes Father      Cataracts Sister         SOCHX:  Social History     Tobacco Use    Smoking status: Never    Smokeless tobacco: Never   Substance Use Topics    Alcohol use: Not Currently     Comment: socially        ALLERGIES:  Patient has no known allergies.    CURRENT  MEDICATIONS:  Current Outpatient Medications on File Prior to Visit   Medication Sig Dispense Refill    ARMOUR THYROID 60 mg Tab TK 1 T PO D  6    aspirin (ECOTRIN) 81 MG EC tablet Take 81 mg by mouth once daily.      FLUoxetine (PROZAC) 20 MG capsule Take 20 mg by mouth Every 3 (three) days.  5    fluticasone propionate (FLONASE) 50 mcg/actuation nasal spray SHAKE LQ AND U 1 SPR IEN QD      gabapentin (NEURONTIN) 100 MG capsule Take 1 capsule (100 mg total) by mouth 3 (three) times daily. 90 capsule 11    multivitamin capsule Take 1 capsule by mouth once daily.      nitroGLYCERIN (NITROSTAT) 0.4 MG SL tablet TK ONE T PO PRN  6    rosuvastatin (CRESTOR) 20 MG tablet Take 20 mg by mouth once daily.      traMADol (ULTRAM) 50 mg tablet Take 50 mg by mouth every 8 (eight) hours as needed.       trazodone (DESYREL) 50 MG tablet Take 50 mg by mouth every evening. Takes 1/2 tablet HS      valACYclovir (VALTREX) 1000 MG tablet        No current facility-administered medications on file prior to visit.       REVIEW OF SYSTEMS:  ROS    GENERAL PHYSICAL EXAM:   There were no vitals taken for this visit.   GEN: well developed, well nourished, no acute distress   HENT: Normocephalic, atraumatic   EYES: No discharge, conjunctiva normal   NECK: Supple, non-tender   PULM: No wheezing, no respiratory distress   CV: RRR   ABD: Soft, non-tender    ORTHO EXAM:   Examination of the right hand and index finger reveals that there is no significant skin change.  She does have deformity associated with arthritis at the distal interphalangeal joint.  There has a approximately 30-40 degrees of angular deformity.  Palpation about the joint does produce tenderness.  There is no erythema or increased warmth over the tip.  She does report grossly intact sensation but decreased sensation over the ulnar side of the digit.  She has capillary refill less than 2 seconds.    RADIOLOGY:   X-ray of the right index finger were taken in clinic today there  was noted to be severe arthritis throughout multiple joints.  The worst of which is the right distal interphalangeal joint.    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 I have discussed treatment options.  I have discussed the possibility of arthrodesis of the distal interphalangeal joint.  I did discuss the risks and benefits of the procedure.  Informed consent has been obtained     2.  Will proceed with right index finger distal interphalangeal joint arthrodesis under general anesthesia     3.  She will follow up with me 2 weeks after the surgery

## 2024-11-06 NOTE — H&P (VIEW-ONLY)
11/6/2024    Chief Complaint:  Chief Complaint   Patient presents with    Left Hand - Pain       HPI:  Irish Felder is a 69 y.o. 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  This has been going on for several years.  It was considerably worse.  She was having worsening of pain.  She also has significant deformity.    PMHX:  Past Medical History:   Diagnosis Date    Anemia     Cancer     skin cancer - nose    Chest pain 2016    GERD (gastroesophageal reflux disease)     Hyperlipidemia     Seasonal allergies        PSHX:  Past Surgical History:   Procedure Laterality Date    AUGMENTATION OF BREAST Bilateral 2010    BACK SURGERY  09/2017    Cyst removed between L4-L5, Dr. Mercedes    BACK SURGERY  08/10/2020    L4-5 fusion, laminectomy    BREAST SURGERY Bilateral     2010    CATARACT EXTRACTION EXTRACAPSULAR W/ INTRAOCULAR LENS IMPLANTATION Bilateral 2012    CHOLECYSTECTOMY      COLONOSCOPY      EYE SURGERY Right 2014    retina surgery    FOOT GANGLION EXCISION Left 12/09/2019    Procedure: EXCISION, GANGLION CYST, FOOT;  Surgeon: Shawn Bañuelos MD;  Location: [facility] OR;  Service: Orthopedics;  Laterality: Left;    HERNIA REPAIR      x2    HYSTERECTOMY  1985    complete    LASIK Bilateral 2004    OU    LUMBAR DISC SURGERY  08/2020    L3-4     OOPHORECTOMY  1985    TENDON RELEASE Right 2009    right heel    TONSILLECTOMY      TOTAL REDUCTION MAMMOPLASTY Bilateral 1983    TUMOR EXCISION Left     Below shoulder    UPPER GASTROINTESTINAL ENDOSCOPY         FMHX:  Family History   Problem Relation Name Age of Onset    Cataracts Mother      Lung cancer Mother      Lung cancer Father      Diabetes Father      Cataracts Sister         SOCHX:  Social History     Tobacco Use    Smoking status: Never    Smokeless tobacco: Never   Substance Use Topics    Alcohol use: Not Currently     Comment: socially        ALLERGIES:  Patient has no known allergies.    CURRENT  MEDICATIONS:  Current Outpatient Medications on File Prior to Visit   Medication Sig Dispense Refill    ARMOUR THYROID 60 mg Tab TK 1 T PO D  6    aspirin (ECOTRIN) 81 MG EC tablet Take 81 mg by mouth once daily.      FLUoxetine (PROZAC) 20 MG capsule Take 20 mg by mouth Every 3 (three) days.  5    fluticasone propionate (FLONASE) 50 mcg/actuation nasal spray SHAKE LQ AND U 1 SPR IEN QD      gabapentin (NEURONTIN) 100 MG capsule Take 1 capsule (100 mg total) by mouth 3 (three) times daily. 90 capsule 11    multivitamin capsule Take 1 capsule by mouth once daily.      nitroGLYCERIN (NITROSTAT) 0.4 MG SL tablet TK ONE T PO PRN  6    rosuvastatin (CRESTOR) 20 MG tablet Take 20 mg by mouth once daily.      traMADol (ULTRAM) 50 mg tablet Take 50 mg by mouth every 8 (eight) hours as needed.       trazodone (DESYREL) 50 MG tablet Take 50 mg by mouth every evening. Takes 1/2 tablet HS      valACYclovir (VALTREX) 1000 MG tablet        No current facility-administered medications on file prior to visit.       REVIEW OF SYSTEMS:  ROS    GENERAL PHYSICAL EXAM:   There were no vitals taken for this visit.   GEN: well developed, well nourished, no acute distress   HENT: Normocephalic, atraumatic   EYES: No discharge, conjunctiva normal   NECK: Supple, non-tender   PULM: No wheezing, no respiratory distress   CV: RRR   ABD: Soft, non-tender    ORTHO EXAM:   Examination of the right hand and index finger reveals that there is no significant skin change.  She does have deformity associated with arthritis at the distal interphalangeal joint.  There has a approximately 30-40 degrees of angular deformity.  Palpation about the joint does produce tenderness.  There is no erythema or increased warmth over the tip.  She does report grossly intact sensation but decreased sensation over the ulnar side of the digit.  She has capillary refill less than 2 seconds.    RADIOLOGY:   X-ray of the right index finger were taken in clinic today there  was noted to be severe arthritis throughout multiple joints.  The worst of which is the right distal interphalangeal joint.    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 I have discussed treatment options.  I have discussed the possibility of arthrodesis of the distal interphalangeal joint.  I did discuss the risks and benefits of the procedure.  Informed consent has been obtained     2.  Will proceed with right index finger distal interphalangeal joint arthrodesis under general anesthesia     3.  She will follow up with me 2 weeks after the surgery

## 2024-11-06 NOTE — PATIENT INSTRUCTIONS
Surgery Instructions:     Your surgery is scheduled on 11/19/2024 at the surgery center: [address], 1st floor, second entrance.    The pre-op department will be in contact with you prior to your procedure to review medications and instructions.       Nothing to eat or drink after midnight prior to day of surgery.    You should STOP taking any blood thinners 5 days prior to surgery.     The surgery center will contact you the day prior to surgery to advise you of your arrival time for surgery.     Your post op appointment is scheduled on 12/4 at 2:00.

## 2024-11-08 DIAGNOSIS — M15.1 DEGENERATIVE ARTHRITIS OF DISTAL INTERPHALANGEAL JOINT OF INDEX FINGER OF RIGHT HAND: Primary | ICD-10-CM

## 2024-11-08 RX ORDER — MUPIROCIN 20 MG/G
OINTMENT TOPICAL
OUTPATIENT
Start: 2024-11-08

## 2024-11-08 RX ORDER — CEFAZOLIN SODIUM 2 G/50ML
2 SOLUTION INTRAVENOUS
OUTPATIENT
Start: 2024-11-08

## 2024-11-18 ENCOUNTER — ANESTHESIA EVENT (OUTPATIENT)
Dept: SURGERY | Facility: HOSPITAL | Age: 69
End: 2024-11-18
Payer: MEDICARE

## 2024-11-19 ENCOUNTER — HOSPITAL ENCOUNTER (OUTPATIENT)
Facility: HOSPITAL | Age: 69
Discharge: HOME | End: 2024-11-19
Attending: ORTHOPAEDIC SURGERY | Admitting: ORTHOPAEDIC SURGERY
Payer: MEDICARE

## 2024-11-19 ENCOUNTER — HOSPITAL ENCOUNTER (OUTPATIENT)
Dept: RADIOLOGY | Facility: HOSPITAL | Age: 69
Discharge: HOME | End: 2024-11-19
Attending: ORTHOPAEDIC SURGERY
Payer: MEDICARE

## 2024-11-19 ENCOUNTER — ANESTHESIA (OUTPATIENT)
Dept: SURGERY | Facility: HOSPITAL | Age: 69
End: 2024-11-19
Payer: MEDICARE

## 2024-11-19 DIAGNOSIS — M79.644 FINGER PAIN, RIGHT: ICD-10-CM

## 2024-11-19 DIAGNOSIS — M15.1 DEGENERATIVE ARTHRITIS OF DISTAL INTERPHALANGEAL JOINT OF INDEX FINGER OF RIGHT HAND: ICD-10-CM

## 2024-11-19 PROCEDURE — 63600175 PHARM REV CODE 636 W HCPCS: Mod: PO | Performed by: PHYSICIAN ASSISTANT

## 2024-11-19 PROCEDURE — 71000033 HC RECOVERY, INTIAL HOUR: Mod: PO | Performed by: ORTHOPAEDIC SURGERY

## 2024-11-19 PROCEDURE — 63600175 PHARM REV CODE 636 W HCPCS: Mod: PO | Performed by: ORTHOPAEDIC SURGERY

## 2024-11-19 PROCEDURE — C1713 ANCHOR/SCREW BN/BN,TIS/BN: HCPCS | Mod: PO | Performed by: ORTHOPAEDIC SURGERY

## 2024-11-19 PROCEDURE — 63600175 PHARM REV CODE 636 W HCPCS: Mod: PO | Performed by: NURSE ANESTHETIST, CERTIFIED REGISTERED

## 2024-11-19 PROCEDURE — 27200651 HC AIRWAY, LMA: Mod: PO | Performed by: ANESTHESIOLOGY

## 2024-11-19 PROCEDURE — 27201423 OPTIME MED/SURG SUP & DEVICES STERILE SUPPLY: Mod: PO | Performed by: ORTHOPAEDIC SURGERY

## 2024-11-19 PROCEDURE — 36000709 HC OR TIME LEV III EA ADD 15 MIN: Mod: PO | Performed by: ORTHOPAEDIC SURGERY

## 2024-11-19 PROCEDURE — 26860 FUSION OF FINGER JOINT: CPT | Mod: F6,,, | Performed by: ORTHOPAEDIC SURGERY

## 2024-11-19 PROCEDURE — 36000708 HC OR TIME LEV III 1ST 15 MIN: Mod: PO | Performed by: ORTHOPAEDIC SURGERY

## 2024-11-19 PROCEDURE — 37000008 HC ANESTHESIA 1ST 15 MINUTES: Mod: PO | Performed by: ORTHOPAEDIC SURGERY

## 2024-11-19 PROCEDURE — 25000003 PHARM REV CODE 250: Mod: PO | Performed by: PHYSICIAN ASSISTANT

## 2024-11-19 PROCEDURE — 71000015 HC POSTOP RECOV 1ST HR: Mod: PO | Performed by: ORTHOPAEDIC SURGERY

## 2024-11-19 PROCEDURE — 37000009 HC ANESTHESIA EA ADD 15 MINS: Mod: PO | Performed by: ORTHOPAEDIC SURGERY

## 2024-11-19 RX ORDER — MUPIROCIN 20 MG/G
OINTMENT TOPICAL
Status: DISCONTINUED | OUTPATIENT
Start: 2024-11-19 | End: 2024-11-19 | Stop reason: HOSPADM

## 2024-11-19 RX ORDER — OXYCODONE HYDROCHLORIDE 5 MG/1
5 TABLET ORAL EVERY 4 HOURS PRN
Qty: 10 TABLET | Refills: 0 | Status: SHIPPED | OUTPATIENT
Start: 2024-11-19

## 2024-11-19 RX ORDER — PHENYLEPHRINE HYDROCHLORIDE 10 MG/ML
INJECTION INTRAVENOUS
Status: DISCONTINUED | OUTPATIENT
Start: 2024-11-19 | End: 2024-11-19

## 2024-11-19 RX ORDER — GLUCAGON 1 MG
1 KIT INJECTION
Status: DISCONTINUED | OUTPATIENT
Start: 2024-11-19 | End: 2024-11-19 | Stop reason: HOSPADM

## 2024-11-19 RX ORDER — CEFAZOLIN SODIUM 1 G/3ML
2 INJECTION, POWDER, FOR SOLUTION INTRAMUSCULAR; INTRAVENOUS
Status: COMPLETED | OUTPATIENT
Start: 2024-11-19 | End: 2024-11-19

## 2024-11-19 RX ORDER — FENTANYL CITRATE 50 UG/ML
25 INJECTION, SOLUTION INTRAMUSCULAR; INTRAVENOUS EVERY 5 MIN PRN
Status: DISCONTINUED | OUTPATIENT
Start: 2024-11-19 | End: 2024-11-19 | Stop reason: HOSPADM

## 2024-11-19 RX ORDER — BUPIVACAINE HYDROCHLORIDE 2.5 MG/ML
INJECTION, SOLUTION EPIDURAL; INFILTRATION; INTRACAUDAL
Status: DISCONTINUED | OUTPATIENT
Start: 2024-11-19 | End: 2024-11-19 | Stop reason: HOSPADM

## 2024-11-19 RX ORDER — MEPERIDINE HYDROCHLORIDE 50 MG/ML
12.5 INJECTION INTRAMUSCULAR; INTRAVENOUS; SUBCUTANEOUS ONCE
Status: DISCONTINUED | OUTPATIENT
Start: 2024-11-19 | End: 2024-11-19 | Stop reason: HOSPADM

## 2024-11-19 RX ORDER — FENTANYL CITRATE 50 UG/ML
INJECTION, SOLUTION INTRAMUSCULAR; INTRAVENOUS
Status: DISCONTINUED | OUTPATIENT
Start: 2024-11-19 | End: 2024-11-19

## 2024-11-19 RX ORDER — MIDAZOLAM HYDROCHLORIDE 1 MG/ML
INJECTION INTRAMUSCULAR; INTRAVENOUS
Status: DISCONTINUED | OUTPATIENT
Start: 2024-11-19 | End: 2024-11-19

## 2024-11-19 RX ORDER — SODIUM CHLORIDE 0.9 % (FLUSH) 0.9 %
10 SYRINGE (ML) INJECTION ONCE
Status: DISCONTINUED | OUTPATIENT
Start: 2024-11-19 | End: 2024-11-19 | Stop reason: HOSPADM

## 2024-11-19 RX ORDER — ONDANSETRON 8 MG/1
8 TABLET, ORALLY DISINTEGRATING ORAL EVERY 8 HOURS PRN
Qty: 4 TABLET | Refills: 0 | Status: SHIPPED | OUTPATIENT
Start: 2024-11-19

## 2024-11-19 RX ORDER — LIDOCAINE HYDROCHLORIDE 10 MG/ML
INJECTION, SOLUTION EPIDURAL; INFILTRATION; INTRACAUDAL; PERINEURAL
Status: DISCONTINUED | OUTPATIENT
Start: 2024-11-19 | End: 2024-11-19 | Stop reason: HOSPADM

## 2024-11-19 RX ORDER — KETOROLAC TROMETHAMINE 30 MG/ML
INJECTION, SOLUTION INTRAMUSCULAR; INTRAVENOUS
Status: DISCONTINUED | OUTPATIENT
Start: 2024-11-19 | End: 2024-11-19

## 2024-11-19 RX ORDER — DIPHENHYDRAMINE HYDROCHLORIDE 50 MG/ML
25 INJECTION INTRAMUSCULAR; INTRAVENOUS EVERY 6 HOURS PRN
Status: DISCONTINUED | OUTPATIENT
Start: 2024-11-19 | End: 2024-11-19 | Stop reason: HOSPADM

## 2024-11-19 RX ORDER — OXYCODONE HYDROCHLORIDE 5 MG/1
5 TABLET ORAL
Status: DISCONTINUED | OUTPATIENT
Start: 2024-11-19 | End: 2024-11-19 | Stop reason: HOSPADM

## 2024-11-19 RX ORDER — LIDOCAINE HYDROCHLORIDE 20 MG/ML
INJECTION INTRAVENOUS
Status: DISCONTINUED | OUTPATIENT
Start: 2024-11-19 | End: 2024-11-19

## 2024-11-19 RX ORDER — LIDOCAINE HYDROCHLORIDE 10 MG/ML
1 INJECTION, SOLUTION EPIDURAL; INFILTRATION; INTRACAUDAL; PERINEURAL ONCE
Status: DISCONTINUED | OUTPATIENT
Start: 2024-11-19 | End: 2024-11-19 | Stop reason: HOSPADM

## 2024-11-19 RX ORDER — PROPOFOL 10 MG/ML
VIAL (ML) INTRAVENOUS
Status: DISCONTINUED | OUTPATIENT
Start: 2024-11-19 | End: 2024-11-19

## 2024-11-19 RX ORDER — SODIUM CHLORIDE, SODIUM LACTATE, POTASSIUM CHLORIDE, CALCIUM CHLORIDE 600; 310; 30; 20 MG/100ML; MG/100ML; MG/100ML; MG/100ML
INJECTION, SOLUTION INTRAVENOUS CONTINUOUS
Status: DISCONTINUED | OUTPATIENT
Start: 2024-11-19 | End: 2024-11-19 | Stop reason: HOSPADM

## 2024-11-19 RX ORDER — ONDANSETRON HYDROCHLORIDE 2 MG/ML
INJECTION, SOLUTION INTRAVENOUS
Status: DISCONTINUED | OUTPATIENT
Start: 2024-11-19 | End: 2024-11-19

## 2024-11-19 RX ORDER — HYDROMORPHONE HYDROCHLORIDE 2 MG/ML
0.2 INJECTION, SOLUTION INTRAMUSCULAR; INTRAVENOUS; SUBCUTANEOUS EVERY 5 MIN PRN
Status: DISCONTINUED | OUTPATIENT
Start: 2024-11-19 | End: 2024-11-19 | Stop reason: HOSPADM

## 2024-11-19 RX ORDER — PROCHLORPERAZINE EDISYLATE 5 MG/ML
5 INJECTION INTRAMUSCULAR; INTRAVENOUS EVERY 4 HOURS PRN
Status: DISCONTINUED | OUTPATIENT
Start: 2024-11-19 | End: 2024-11-19 | Stop reason: HOSPADM

## 2024-11-19 RX ADMIN — MIDAZOLAM HYDROCHLORIDE 2 MG: 1 INJECTION, SOLUTION INTRAMUSCULAR; INTRAVENOUS at 09:11

## 2024-11-19 RX ADMIN — FENTANYL CITRATE 25 MCG: 50 INJECTION, SOLUTION INTRAMUSCULAR; INTRAVENOUS at 10:11

## 2024-11-19 RX ADMIN — LIDOCAINE HYDROCHLORIDE 100 MG: 20 INJECTION INTRAVENOUS at 09:11

## 2024-11-19 RX ADMIN — PHENYLEPHRINE HYDROCHLORIDE 100 MCG: 10 INJECTION INTRAVENOUS at 09:11

## 2024-11-19 RX ADMIN — ONDANSETRON 4 MG: 2 INJECTION, SOLUTION INTRAMUSCULAR; INTRAVENOUS at 09:11

## 2024-11-19 RX ADMIN — CEFAZOLIN 2 G: 330 INJECTION, POWDER, FOR SOLUTION INTRAMUSCULAR; INTRAVENOUS at 09:11

## 2024-11-19 RX ADMIN — FENTANYL CITRATE 25 MCG: 50 INJECTION, SOLUTION INTRAMUSCULAR; INTRAVENOUS at 09:11

## 2024-11-19 RX ADMIN — KETOROLAC TROMETHAMINE 30 MG: 30 INJECTION, SOLUTION INTRAMUSCULAR at 10:11

## 2024-11-19 RX ADMIN — MUPIROCIN: 20 OINTMENT TOPICAL at 08:11

## 2024-11-19 RX ADMIN — PROPOFOL 120 MG: 10 INJECTION, EMULSION INTRAVENOUS at 09:11

## 2024-11-19 NOTE — DISCHARGE INSTRUCTIONS
Procedure:  Right index finger tip fusion    1. Keep the splint clean, dry, and in place until follow-up. Do not take it off and do not get it wet.    2. Please keep the right upper extremity elevated for the 1st 24-48 hours to prevent further swelling    3. Flexion and extension of the exposed fingers is allowed but do not attempt to lift or push off with the right arm or hand    4. Pain medication and nausea medication have been prescribed. Please take them as necessary.    5. If there are any questions or concerns please call Dr. Morgan's office at [phone]    6. Follow-up with Dr. Morgan in 2 weeks

## 2024-11-19 NOTE — ANESTHESIA POSTPROCEDURE EVALUATION
Anesthesia Post Evaluation    Patient: Irish Felder    Procedure(s) Performed: Procedure(s) (LRB):  Right index finger distal interphalangeal joint arthrodesis (Right)    Final Anesthesia Type: general      Patient location during evaluation: PACU  Patient participation: Yes- Able to Participate  Level of consciousness: awake and alert  Post-procedure vital signs: reviewed and stable  Pain management: adequate  Airway patency: patent    PONV status at discharge: No PONV  Anesthetic complications: no      Cardiovascular status: blood pressure returned to baseline  Respiratory status: unassisted  Hydration status: euvolemic  Follow-up not needed.              Vitals Value Taken Time   /56 11/19/24 1055   Temp   11/19/24 1506   Pulse 71 11/19/24 1055   Resp 12 11/19/24 1055   SpO2 96 % 11/19/24 1055         Event Time   Out of Recovery 10:55:00         Pain/Ousmane Score: Ousmane Score: 10 (11/19/2024 11:14 AM)

## 2024-11-19 NOTE — TRANSFER OF CARE
"Anesthesia Transfer of Care Note    Patient: Irish Felder    Procedure(s) Performed: Procedure(s) (LRB):  Right index finger distal interphalangeal joint arthrodesis (Right)    Patient location: PACU    Anesthesia Type: general    Transport from OR: Transported from OR on 2-3 L/min O2 by NC with adequate spontaneous ventilation    Post pain: adequate analgesia    Post assessment: no apparent anesthetic complications and tolerated procedure well    Post vital signs: stable    Level of consciousness: sedated    Nausea/Vomiting: no nausea/vomiting    Complications: none    Transfer of care protocol was followed      Last vitals: Visit Vitals  BP (!) 96/51   Pulse 64   Temp 36.6 °C (97.9 °F) (Skin)   Resp 12   Ht 5' 1" (1.549 m)   Wt 58.1 kg (128 lb)   SpO2 97%   Breastfeeding No   BMI 24.19 kg/m²     "

## 2024-11-19 NOTE — OP NOTE
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 Felder has a history of pain and deformity to her right index fingertip.  She was noted have severe arthritis of the distal interphalangeal joint.  Due to the deformity and pain I discussed the possibility of arthrodesis of the joint.  Informed consent was then obtained    PROCEDURE IN DETAIL:     Ms. Felder was transported to the operating room and was placed supine on the operating room table. All appropriate points were padded. The right arm and hand was prepped and draped in the normal sterile fashion. Time out was called. The correct patient, correct operative site, correct procedure, antibiotic administration which consisted of 2 g of Ancef, and allergies to medications which are to Patient has no known allergies.  were reviewed. Time in was then called.     Attention was turned to the right index finger.  A Y-shaped incision was made directly over the dorsum of the distal interphalangeal joint.  The incision was carried through the skin.  Subcutaneous tissues were dissected sharply.  The extensor mechanism was elevated over the joint.  The distal interphalangeal joint was visualized.  There was noted to be large osteophytes with severe arthritis.  The osteophytes were removed with rongeur.  The edges of the articular surface for the proximal and distal phalanx were debrided with sagittal saw.  With good proximal and distal cuts noted the finger was aligned under  fluoroscopy.  There was excellent realignment of the finger in both the AP and lateral planes.  At that point a guidewire for a Nelida AcuTrak screw was placed down the central portion of the distal phalanx and into the center of the middle phalanx.  Its position was confirmed under fluoroscopy.  The guidewire was then over drilled.  A 22 mm Nelida AcuTrak screw was then advanced over the wire.  This provided firm compression across the distal interphalangeal joint with good positioning of the proximal and distal phalanx.  There was firm fixation noted.  The wound was then copiously irrigated.  Tourniquet was let down and hemostasis was obtained.  The extensor mechanism was repaired with 4-0 Vicryl suture.  The wound was closed with 4-0 chromic suture.  The wounds were dressed with Xeroform, gauze padding, cast padding and a short-arm radial gutter splint was placed    The patient was awakened from anesthesia and was transported to the recovery room in stable condition. All lap, needle, sponge, and equipment counts were correct at the end of the case.    POST-OPERATIVE PLAN:     Patient will keep the splint in place for 2 weeks which time she will follow up with me.  She will be placed into a finger splint at that time.

## 2024-11-19 NOTE — ANESTHESIA PROCEDURE NOTES
Intubation    Date/Time: 11/19/2024 9:08 AM    Performed by: Malini Hanks CRNA  Authorized by: Brian Pinedo MD    Intubation:     Induction:  Intravenous    Intubated:  Postinduction    Mask Ventilation:  Not attempted    Attempts:  1    Attempted By:  CRNA    Difficult Airway Encountered?: No      Complications:  None    Airway Device:  Supraglottic airway/LMA    Airway Device Size:  3.0    Style/Cuff Inflation:  Cuffed (inflated to minimal occlusive pressure)    Placement Verified By:  Capnometry    Findings Post-Intubation:  Atraumatic/condition of teeth unchanged      
normal (ped)...

## 2024-11-19 NOTE — ANESTHESIA PREPROCEDURE EVALUATION
11/19/2024  Irish Felder is a 69 y.o., female.      Pre-op Assessment    I have reviewed the Patient Summary Reports.     I have reviewed the Nursing Notes. I have reviewed the NPO Status.   I have reviewed the Medications.     Review of Systems  Anesthesia Hx:             Denies Family Hx of Anesthesia complications.   Personal Hx of Anesthesia complications, Post-Operative Nausea/Vomiting, in the past, but not with recent anesthetics / prophylaxis                    Hepatic/GI:     GERD, well controlled                Musculoskeletal:  Arthritis                   Physical Exam  General: Well nourished, Cooperative, Alert and Oriented    Airway:  Mallampati: II   Mouth Opening: Normal  TM Distance: Normal  Tongue: Normal  Neck ROM: Normal ROM    Chest/Lungs:  Normal Respiratory Rate    Heart:  Rate: Normal  Rhythm: Regular Rhythm        Anesthesia Plan  Type of Anesthesia, risks & benefits discussed:    Anesthesia Type: Gen Supraglottic Airway  Intra-op Monitoring Plan: Standard ASA Monitors  Post Op Pain Control Plan: multimodal analgesia  Induction:  IV  Airway Plan: , Post-Induction  Informed Consent: Informed consent signed with the Patient and all parties understand the risks and agree with anesthesia plan.  All questions answered.   ASA Score: 2    Ready For Surgery From Anesthesia Perspective.     .

## 2024-11-19 NOTE — DISCHARGE SUMMARY
[facility] - Surgery  Discharge Note  Short Stay    Procedure(s) (LRB):  Right index finger distal interphalangeal joint arthrodesis (Right)      OUTCOME: Patient tolerated treatment/procedure well without complication and is now ready for discharge.    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    FOLLOWUP: In clinic    DISCHARGE INSTRUCTIONS:    Discharge Procedure Orders   SLING ORTHOPEDIC LARGE FOR HOME USE     Diet general     Activity as tolerated     Keep surgical extremity elevated     Lifting restrictions   Order Comments: Please do not lift or push off with the right arm or hand     Leave dressing on - Keep it clean, dry, and intact until clinic visit     Call MD for:  temperature >100.4     Call MD for:  persistent nausea and vomiting     Call MD for:  severe uncontrolled pain     Call MD for:  difficulty breathing, headache or visual disturbances     Call MD for:  redness, tenderness, or signs of infection (pain, swelling, redness, odor or green/yellow discharge around incision site)     Call MD for:  hives     Call MD for:  persistent dizziness or light-headedness     Call MD for:  extreme fatigue        TIME SPENT ON DISCHARGE: 15 minutes

## 2024-11-20 VITALS
TEMPERATURE: 98 F | DIASTOLIC BLOOD PRESSURE: 56 MMHG | RESPIRATION RATE: 12 BRPM | WEIGHT: 128 LBS | HEIGHT: 61 IN | BODY MASS INDEX: 24.17 KG/M2 | OXYGEN SATURATION: 96 % | SYSTOLIC BLOOD PRESSURE: 115 MMHG | HEART RATE: 71 BPM

## 2024-11-27 DIAGNOSIS — M15.1 DEGENERATIVE ARTHRITIS OF DISTAL INTERPHALANGEAL JOINT OF INDEX FINGER OF RIGHT HAND: Primary | ICD-10-CM

## 2024-12-04 ENCOUNTER — HOSPITAL ENCOUNTER (OUTPATIENT)
Dept: RADIOLOGY | Facility: HOSPITAL | Age: 69
Discharge: HOME | End: 2024-12-04
Attending: ORTHOPAEDIC SURGERY
Payer: MEDICARE

## 2024-12-04 ENCOUNTER — OFFICE VISIT (OUTPATIENT)
Dept: ORTHOPEDICS | Facility: CLINIC | Age: 69
End: 2024-12-04
Payer: MEDICARE

## 2024-12-04 VITALS
WEIGHT: 128.06 LBS | SYSTOLIC BLOOD PRESSURE: 115 MMHG | BODY MASS INDEX: 24.18 KG/M2 | HEIGHT: 61 IN | DIASTOLIC BLOOD PRESSURE: 56 MMHG | HEART RATE: 71 BPM

## 2024-12-04 DIAGNOSIS — M15.1 DEGENERATIVE ARTHRITIS OF DISTAL INTERPHALANGEAL JOINT OF INDEX FINGER OF RIGHT HAND: Primary | ICD-10-CM

## 2024-12-04 DIAGNOSIS — M15.1 DEGENERATIVE ARTHRITIS OF DISTAL INTERPHALANGEAL JOINT OF INDEX FINGER OF RIGHT HAND: ICD-10-CM

## 2024-12-04 PROCEDURE — 3074F SYST BP LT 130 MM HG: CPT | Mod: CPTII,S$GLB,, | Performed by: ORTHOPAEDIC SURGERY

## 2024-12-04 PROCEDURE — 99999 PR PBB SHADOW E&M-EST. PATIENT-LVL III: CPT | Mod: PBBFAC,,, | Performed by: ORTHOPAEDIC SURGERY

## 2024-12-04 PROCEDURE — 99024 POSTOP FOLLOW-UP VISIT: CPT | Mod: S$GLB,,, | Performed by: ORTHOPAEDIC SURGERY

## 2024-12-04 PROCEDURE — 1101F PT FALLS ASSESS-DOCD LE1/YR: CPT | Mod: CPTII,S$GLB,, | Performed by: ORTHOPAEDIC SURGERY

## 2024-12-04 PROCEDURE — 1159F MED LIST DOCD IN RCRD: CPT | Mod: CPTII,S$GLB,, | Performed by: ORTHOPAEDIC SURGERY

## 2024-12-04 PROCEDURE — 1125F AMNT PAIN NOTED PAIN PRSNT: CPT | Mod: CPTII,S$GLB,, | Performed by: ORTHOPAEDIC SURGERY

## 2024-12-04 PROCEDURE — 3078F DIAST BP <80 MM HG: CPT | Mod: CPTII,S$GLB,, | Performed by: ORTHOPAEDIC SURGERY

## 2024-12-04 PROCEDURE — 73140 X-RAY EXAM OF FINGER(S): CPT | Mod: TC,PO,RT

## 2024-12-04 PROCEDURE — 3288F FALL RISK ASSESSMENT DOCD: CPT | Mod: CPTII,S$GLB,, | Performed by: ORTHOPAEDIC SURGERY

## 2024-12-04 PROCEDURE — 3044F HG A1C LEVEL LT 7.0%: CPT | Mod: CPTII,S$GLB,, | Performed by: ORTHOPAEDIC SURGERY

## 2024-12-04 PROCEDURE — 73140 X-RAY EXAM OF FINGER(S): CPT | Mod: 26,RT,, | Performed by: RADIOLOGY

## 2024-12-04 NOTE — PROGRESS NOTES
Ms Felder returns to clinic today.  She was status post right index finger distal interphalangeal joint arthrodesis.  She was overall doing well     Physical exam: Examination of the right index finger reveals that the incision is healing well.  There are sutures in place.  There is mild to moderate edema.  There was no significant erythema or drainage.  Has capillary refill less than 2 seconds at the tip     Radiology: X-rays of the right index finger were taken in clinic today there is evidence of the fusion of the distal interphalangeal joint.  The screw remains well-positioned and fixed.      Assessment: Status post right index finger distal interphalangeal joint arthrodesis     Plan:     1. The tops of the sutures were removed today     2.  I did place Steri-Strips over the wound     3.  She can begin washing     4.  Will place her into a Velcro extension splint for the D IP joint     5.  She will follow up in 2-3 weeks with repeat x-ray of the right index finger at which time I discussed the possibility of discontinuing the splint

## 2024-12-05 ENCOUNTER — TELEPHONE (OUTPATIENT)
Dept: PHYSICAL MEDICINE AND REHAB | Facility: CLINIC | Age: 69
End: 2024-12-05
Payer: MEDICARE

## 2024-12-05 NOTE — TELEPHONE ENCOUNTER
Called and spoke with pt and alethea her for Feb 20th, 2025 @ 2:00. Pt v/u. No further assistance is needed at this time.     ----- Message from Echo sent at 12/5/2024 10:52 AM CST -----  Regarding: sooner apt  Contact: pt  Type:  Sooner Appointment Request    Caller is requesting a sooner appointment.  Caller declined first available appointment listed below.  Caller will not accept being placed on the waitlist and is requesting a message be sent to doctor.    Name of Caller:  pt   When is the first available appointment?    Symptoms:  right knee pain np  Would the patient rather a call back or a response via [facility]?   Best Call Back Number:  [phone]    Additional Information:  call to be seen thanks

## 2024-12-05 NOTE — TELEPHONE ENCOUNTER
----- Message from Echo sent at 12/5/2024 10:52 AM CST -----  Regarding: sooner apt  Contact: pt  Type:  Sooner Appointment Request    Caller is requesting a sooner appointment.  Caller declined first available appointment listed below.  Caller will not accept being placed on the waitlist and is requesting a message be sent to doctor.    Name of Caller:  pt   When is the first available appointment?    Symptoms:  right knee pain np  Would the patient rather a call back or a response via [facility]?   Best Call Back Number:  [phone]    Additional Information:  call to be seen thanks

## 2024-12-20 ENCOUNTER — TELEPHONE (OUTPATIENT)
Dept: ORTHOPEDICS | Facility: CLINIC | Age: 69
End: 2024-12-20
Payer: MEDICARE

## 2024-12-26 DIAGNOSIS — M15.1 DEGENERATIVE ARTHRITIS OF DISTAL INTERPHALANGEAL JOINT OF INDEX FINGER OF RIGHT HAND: Primary | ICD-10-CM

## 2025-01-06 ENCOUNTER — HOSPITAL ENCOUNTER (OUTPATIENT)
Dept: RADIOLOGY | Facility: HOSPITAL | Age: 70
Discharge: HOME | End: 2025-01-06
Attending: ORTHOPAEDIC SURGERY
Payer: MEDICARE

## 2025-01-06 ENCOUNTER — OFFICE VISIT (OUTPATIENT)
Dept: ORTHOPEDICS | Facility: CLINIC | Age: 70
End: 2025-01-06
Payer: MEDICARE

## 2025-01-06 DIAGNOSIS — M15.1 DEGENERATIVE ARTHRITIS OF DISTAL INTERPHALANGEAL JOINT OF INDEX FINGER OF RIGHT HAND: ICD-10-CM

## 2025-01-06 DIAGNOSIS — M15.1 DEGENERATIVE ARTHRITIS OF DISTAL INTERPHALANGEAL JOINT OF INDEX FINGER OF RIGHT HAND: Primary | ICD-10-CM

## 2025-01-06 PROCEDURE — 3288F FALL RISK ASSESSMENT DOCD: CPT | Mod: CPTII,S$GLB,, | Performed by: ORTHOPAEDIC SURGERY

## 2025-01-06 PROCEDURE — 73140 X-RAY EXAM OF FINGER(S): CPT | Mod: TC,PO,RT

## 2025-01-06 PROCEDURE — 99999 PR PBB SHADOW E&M-EST. PATIENT-LVL II: CPT | Mod: PBBFAC,,, | Performed by: ORTHOPAEDIC SURGERY

## 2025-01-06 PROCEDURE — 1101F PT FALLS ASSESS-DOCD LE1/YR: CPT | Mod: CPTII,S$GLB,, | Performed by: ORTHOPAEDIC SURGERY

## 2025-01-06 PROCEDURE — 1159F MED LIST DOCD IN RCRD: CPT | Mod: CPTII,S$GLB,, | Performed by: ORTHOPAEDIC SURGERY

## 2025-01-06 PROCEDURE — 99024 POSTOP FOLLOW-UP VISIT: CPT | Mod: S$GLB,,, | Performed by: ORTHOPAEDIC SURGERY

## 2025-01-06 PROCEDURE — 1126F AMNT PAIN NOTED NONE PRSNT: CPT | Mod: CPTII,S$GLB,, | Performed by: ORTHOPAEDIC SURGERY

## 2025-01-06 PROCEDURE — 73140 X-RAY EXAM OF FINGER(S): CPT | Mod: 26,RT,, | Performed by: RADIOLOGY

## 2025-01-06 NOTE — PROGRESS NOTES
Ms Felder returns to clinic today.  Has a history of right index finger distal interphalangeal joint arthrodesis.  She underwent surgery approximately 7-8 weeks ago.  She is overall doing well     Physical exam: Examination of the right index finger reveals that the wound is well healed.  There is minimal edema.  There was no erythema.  She has a very minimal tenderness to palpation over the distal interphalangeal joint.  She does have capillary refill less than 2 seconds at the tip of the finger     Radiology: X-rays of the right index finger were taken in clinic today.  She was noted have evidence of the arthrodesis.  The screw remains well fixed.  The joint remains well aligned.      Assessment: Right index finger distal interphalangeal joint arthrodesis     Plan:    1. She can continue to wean out of the splint.  She will only wear it for heavier activity     2.  She can continue to work on range of motion of the MCP and the PIP joint     3.  She will follow up in 3-4 weeks with repeat x-ray of the right index finger which point I will consider discontinuing splinting

## 2025-01-30 DIAGNOSIS — M15.1 DEGENERATIVE ARTHRITIS OF DISTAL INTERPHALANGEAL JOINT OF INDEX FINGER OF RIGHT HAND: Primary | ICD-10-CM

## 2025-02-03 ENCOUNTER — HOSPITAL ENCOUNTER (OUTPATIENT)
Dept: RADIOLOGY | Facility: HOSPITAL | Age: 70
Discharge: HOME | End: 2025-02-03
Attending: ORTHOPAEDIC SURGERY
Payer: MEDICARE

## 2025-02-03 ENCOUNTER — OFFICE VISIT (OUTPATIENT)
Dept: ORTHOPEDICS | Facility: CLINIC | Age: 70
End: 2025-02-03
Payer: MEDICARE

## 2025-02-03 DIAGNOSIS — M15.1 DEGENERATIVE ARTHRITIS OF DISTAL INTERPHALANGEAL JOINT OF INDEX FINGER OF RIGHT HAND: ICD-10-CM

## 2025-02-03 DIAGNOSIS — M15.1 DEGENERATIVE ARTHRITIS OF DISTAL INTERPHALANGEAL JOINT OF INDEX FINGER OF RIGHT HAND: Primary | ICD-10-CM

## 2025-02-03 PROCEDURE — 99024 POSTOP FOLLOW-UP VISIT: CPT | Mod: S$GLB,,, | Performed by: ORTHOPAEDIC SURGERY

## 2025-02-03 PROCEDURE — 3288F FALL RISK ASSESSMENT DOCD: CPT | Mod: CPTII,S$GLB,, | Performed by: ORTHOPAEDIC SURGERY

## 2025-02-03 PROCEDURE — 73140 X-RAY EXAM OF FINGER(S): CPT | Mod: 26,RT,, | Performed by: RADIOLOGY

## 2025-02-03 PROCEDURE — 99999 PR PBB SHADOW E&M-EST. PATIENT-LVL II: CPT | Mod: PBBFAC,,, | Performed by: ORTHOPAEDIC SURGERY

## 2025-02-03 PROCEDURE — 1126F AMNT PAIN NOTED NONE PRSNT: CPT | Mod: CPTII,S$GLB,, | Performed by: ORTHOPAEDIC SURGERY

## 2025-02-03 PROCEDURE — 1159F MED LIST DOCD IN RCRD: CPT | Mod: CPTII,S$GLB,, | Performed by: ORTHOPAEDIC SURGERY

## 2025-02-03 PROCEDURE — 1101F PT FALLS ASSESS-DOCD LE1/YR: CPT | Mod: CPTII,S$GLB,, | Performed by: ORTHOPAEDIC SURGERY

## 2025-02-03 PROCEDURE — 73140 X-RAY EXAM OF FINGER(S): CPT | Mod: TC,PO,RT

## 2025-02-03 NOTE — PROGRESS NOTES
Ms Felder returns to clinic today.  Has a history of right index finger distal interphalangeal joint arthrodesis.  She is nearing the three-month netta.  She is overall doing well     Physical exam: Examination of the right index finger reveals that the wounds are well healed.  There is no significant edema.  There was minimal erythema.  Palpation does not produce a significant of tenderness.  Has capillary refill less than 2 seconds at the tip     Radiology: X-rays of the right index finger were taken in clinic today.  He is noted have evidence of the arthrodesis.  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 She can increase her activity as tolerated     2. She can discontinue all splinting     3. She will follow up 3 months for final evaluation

## 2025-02-24 ENCOUNTER — TELEPHONE (OUTPATIENT)
Dept: PHYSICAL MEDICINE AND REHAB | Facility: CLINIC | Age: 70
End: 2025-02-24
Payer: MEDICARE

## 2025-04-22 ENCOUNTER — OFFICE VISIT (OUTPATIENT)
Dept: PHYSICAL MEDICINE AND REHAB | Facility: CLINIC | Age: 70
End: 2025-04-22
Payer: MEDICARE

## 2025-04-22 ENCOUNTER — HOSPITAL ENCOUNTER (OUTPATIENT)
Dept: RADIOLOGY | Facility: HOSPITAL | Age: 70
Discharge: HOME | End: 2025-04-22
Attending: PHYSICAL MEDICINE & REHABILITATION
Payer: MEDICARE

## 2025-04-22 VITALS
SYSTOLIC BLOOD PRESSURE: 113 MMHG | HEART RATE: 104 BPM | BODY MASS INDEX: 34.43 KG/M2 | DIASTOLIC BLOOD PRESSURE: 56 MMHG | WEIGHT: 182.19 LBS

## 2025-04-22 DIAGNOSIS — M22.2X1 PATELLOFEMORAL PAIN SYNDROME OF RIGHT KNEE: ICD-10-CM

## 2025-04-22 DIAGNOSIS — M25.561 CHRONIC PAIN OF RIGHT KNEE: Primary | ICD-10-CM

## 2025-04-22 DIAGNOSIS — G89.29 CHRONIC PAIN OF RIGHT KNEE: ICD-10-CM

## 2025-04-22 DIAGNOSIS — M25.561 CHRONIC PAIN OF RIGHT KNEE: ICD-10-CM

## 2025-04-22 DIAGNOSIS — G89.29 CHRONIC PAIN OF RIGHT KNEE: Primary | ICD-10-CM

## 2025-04-22 DIAGNOSIS — M17.11 PRIMARY OSTEOARTHRITIS OF RIGHT KNEE: Primary | ICD-10-CM

## 2025-04-22 PROCEDURE — 1125F AMNT PAIN NOTED PAIN PRSNT: CPT | Mod: CPTII,S$GLB,, | Performed by: PHYSICAL MEDICINE & REHABILITATION

## 2025-04-22 PROCEDURE — 3078F DIAST BP <80 MM HG: CPT | Mod: CPTII,S$GLB,, | Performed by: PHYSICAL MEDICINE & REHABILITATION

## 2025-04-22 PROCEDURE — 99999 PR PBB SHADOW E&M-EST. PATIENT-LVL III: CPT | Mod: PBBFAC,,, | Performed by: PHYSICAL MEDICINE & REHABILITATION

## 2025-04-22 PROCEDURE — 20611 DRAIN/INJ JOINT/BURSA W/US: CPT | Mod: RT,S$GLB,, | Performed by: PHYSICAL MEDICINE & REHABILITATION

## 2025-04-22 PROCEDURE — 73562 X-RAY EXAM OF KNEE 3: CPT | Mod: TC,PO,RT

## 2025-04-22 PROCEDURE — 3074F SYST BP LT 130 MM HG: CPT | Mod: CPTII,S$GLB,, | Performed by: PHYSICAL MEDICINE & REHABILITATION

## 2025-04-22 PROCEDURE — 1159F MED LIST DOCD IN RCRD: CPT | Mod: CPTII,S$GLB,, | Performed by: PHYSICAL MEDICINE & REHABILITATION

## 2025-04-22 PROCEDURE — 3008F BODY MASS INDEX DOCD: CPT | Mod: CPTII,S$GLB,, | Performed by: PHYSICAL MEDICINE & REHABILITATION

## 2025-04-22 PROCEDURE — 73562 X-RAY EXAM OF KNEE 3: CPT | Mod: 26,RT,, | Performed by: STUDENT IN AN ORGANIZED HEALTH CARE EDUCATION/TRAINING PROGRAM

## 2025-04-22 PROCEDURE — 73560 X-RAY EXAM OF KNEE 1 OR 2: CPT | Mod: 26,59,LT, | Performed by: STUDENT IN AN ORGANIZED HEALTH CARE EDUCATION/TRAINING PROGRAM

## 2025-04-22 PROCEDURE — 99204 OFFICE O/P NEW MOD 45 MIN: CPT | Mod: 25,S$GLB,, | Performed by: PHYSICAL MEDICINE & REHABILITATION

## 2025-04-22 RX ORDER — TRIAMCINOLONE ACETONIDE 40 MG/ML
40 INJECTION, SUSPENSION INTRA-ARTICULAR; INTRAMUSCULAR
Status: DISCONTINUED | OUTPATIENT
Start: 2025-04-22 | End: 2025-04-22 | Stop reason: HOSPADM

## 2025-04-22 RX ORDER — LIDOCAINE HYDROCHLORIDE 10 MG/ML
4 INJECTION, SOLUTION INFILTRATION; PERINEURAL
Status: DISCONTINUED | OUTPATIENT
Start: 2025-04-22 | End: 2025-04-22 | Stop reason: HOSPADM

## 2025-04-22 RX ADMIN — LIDOCAINE HYDROCHLORIDE 4 ML: 10 INJECTION, SOLUTION INFILTRATION; PERINEURAL at 01:04

## 2025-04-22 RX ADMIN — TRIAMCINOLONE ACETONIDE 40 MG: 40 INJECTION, SUSPENSION INTRA-ARTICULAR; INTRAMUSCULAR at 01:04

## 2025-04-22 NOTE — PROCEDURES
Large Joint Aspiration/Injection: R knee    Date/Time: 4/22/2025 1:00 PM    Performed by: Raven Collazo, DO  Authorized by: Raven Collazo, DO    Consent Done?:  Yes (Verbal)  Indications:  Pain, joint swelling, arthritis and diagnostic evaluation  Site marked: the procedure site was marked    Timeout: prior to procedure the correct patient, procedure, and site was verified    Prep: patient was prepped and draped in usual sterile fashion      Local anesthesia used?: Yes    Anesthesia:  Local infiltration  Local anesthetic:  Lidocaine 1% without epinephrine  Anesthetic total (ml):  1      Details:  Needle Size:  18 G  Ultrasonic Guidance for needle placement?: Yes    Images are saved and documented.  Approach:  Lateral  Location:  Knee  Site:  R knee  Medications:  4 mL LIDOcaine HCL 10 mg/ml (1%) 10 mg/mL (1 %); 40 mg triamcinolone acetonide 40 mg/mL  Aspirate amount (mL):  14  Aspirate:  Serous and yellow  Patient tolerance:  Patient tolerated the procedure well with no immediate complications     Ultrasound guidance was used for correct needle placement, the images were saved and will be uploaded to EMR.       - Hypotensive on admission   - required pressors in ICU  - Midodrine d/cd on 5/30  - BP remains stable will continue to monitor

## 2025-04-22 NOTE — PROGRESS NOTES
[facility]    CHIEF COMPLAINT:   Chief Complaint   Patient presents with    Knee Pain     Pt c/o of right knee pain, says her knee has been bothering her since 10/2024.        HISTORY OF PRESENT ILLNESS: Irish Felder is a 69 y.o. female who presents to me for evaluation and management of right knee pain.     Today reports, chronic right knee pain with noted injury to knee stepping off boat with noted give way of knee back in October 2024, pain managed with knee brace and oral meds. Continues today with pain to anterior knee with radiation of pain to posterior knee. Noted swelling with difficulty flexing knee.     Medications: tylenol PRN, tramadol for chronic low back pain  Injections:   - right knee steroid 05/31/2018  Procedures:   - right knee deep genicular block 08/17/2017  Therapies: no recent PT  Bracing: compression sleeve  DME: none    Review of Systems   Constitutional: Negative for fever.   HENT: Negative for drooling.    Eyes: Negative for discharge.   Respiratory: Negative for choking.    Cardiovascular: Negative for chest pain.   Genitourinary: Negative for flank pain.   Skin: Negative for wound.   Allergic/Immunologic: Negative for immunocompromised state.   Neurological: Negative for tremors and syncope.   Psychiatric/Behavioral: Negative for behavioral problems.     Past Medical History:   Past Medical History:   Diagnosis Date    Anemia     Cancer     skin cancer - nose    Chest pain 2016    GERD (gastroesophageal reflux disease)     Hyperlipidemia     Seasonal allergies        Past Surgical History:   Past Surgical History:   Procedure Laterality Date    AUGMENTATION OF BREAST Bilateral 2010    BACK SURGERY  09/2017    Cyst removed between L4-L5, Dr. Mercedes    BACK SURGERY  08/10/2020    L4-5 fusion, laminectomy    BREAST SURGERY Bilateral     2010    CATARACT EXTRACTION EXTRACAPSULAR W/ INTRAOCULAR LENS IMPLANTATION Bilateral 2012     CHOLECYSTECTOMY      COLONOSCOPY      EYE SURGERY Right 2014    retina surgery    FOOT GANGLION EXCISION Left 12/09/2019    Procedure: EXCISION, GANGLION CYST, FOOT;  Surgeon: Shawn Bañuelos MD;  Location: [facility] OR;  Service: Orthopedics;  Laterality: Left;    FUSION, JOINT, FINGER Right 11/19/2024    Procedure: Right index finger distal interphalangeal joint arthrodesis;  Surgeon: Kirk Morgan MD;  Location: [facility] OR;  Service: Orthopedics;  Laterality: Right;    HERNIA REPAIR      x2    HYSTERECTOMY  1985    complete    LASIK Bilateral 2004    OU    LUMBAR DISC SURGERY  08/2020    L3-4     OOPHORECTOMY  1985    TENDON RELEASE Right 2009    right heel    TONSILLECTOMY      TOTAL REDUCTION MAMMOPLASTY Bilateral 1983    TUMOR EXCISION Left     Below shoulder    UPPER GASTROINTESTINAL ENDOSCOPY         Family History:   Family History   Problem Relation Name Age of Onset    Cataracts Mother      Lung cancer Mother      Lung cancer Father      Diabetes Father      Cataracts Sister         Medications: Medications Ordered Prior to Encounter[1]    Allergies: Review of patient's allergies indicates:  No Known Allergies    Social History:   Social History     Socioeconomic History    Marital status:    Tobacco Use    Smoking status: Never    Smokeless tobacco: Never   Substance and Sexual Activity    Alcohol use: Not Currently     Comment: Since 2018    Drug use: No       PHYSICAL EXAMINATION:   Vitals:    04/22/25 1316   BP: (!) 113/56   BP Location: Left forearm   Patient Position: Sitting   Pulse: 104   Weight: 82.6 kg (182 lb 3.4 oz)     Constitutional: No apparent distress. Pleasant.  HENT: Trachea midline.  Head: Normocephalic and atraumatic.   Eyes: Right eye exhibits no discharge. Left eye exhibits no discharge. No scleral icterus.   CV: Well perfused.   Pulmonary/Chest: Effort normal. No respiratory distress.   Abdominal: There is no guarding.   Neurological: Awake, alert and cooperative.  SKIN:  Intact no apparent lesions, cut, ulcers or abrasions  EXT:  No cyanosis, clubbing, or edema.  SENSORY: Intact to light touch in the bilateral lower extemities.  KNEE EXAMINATION:  MUSCULOSKELETAL:   Muscle Strength:(0-5)                             Right     Left  Hip Flexors                5  5  Hip Abductor              5  5  Hip Adductor              5  5  Knee Extensors          5  5  Knee Flexors              5  5  Ankle Dorsiflex           5  5  Ankle Planterflex        5  5  EHL                            5  5    Reflexes: (0-4+/4)   Right     Left  Patellar(L4)  2+  2+  Ankle(S1)  2+  2+       INSPECTION:                                                                           Right                Left        Localized/Generalized swelling:                     -                       -  Muscle contours normal and symmetrical:     +                      +  Patellas symetrical and level:                         +                      +  Ecchymoses:                                                   -                       -  Erythema:                                                        -                       -  Gross deformity:                                             -                       -     KNEE DEFORMITY:            Right                Left  Normal:                       +                      +  Genu varus:                -                       -  Genu valgum:             -                       -  Genu Recurvatum:     -                       -     RANGE OF MOTION:           Right                Left  Flexion:                       Full                  Full        Extension:                   Full                  Full  Other:      TENDERNESS:                        Right                Left  Medial Tibial Plateau:             -                       -  Lateral Tibial Plateau:             -                       -  Medial Femoral Condyle:        -                       -  Lateral Femoral  Condyle:       -                       -  Head of the Fibula:                 -                       -  Medial joint line:                      +                       -  Lateral joint line:                      -                       -  Patella:                                    +                       -  Medial collateral lig:                -                       -  Lateral collateral lig:                -                       -  Pes Anserine:                         -                       -     SOFT TISSUE PALPATION:                                       Right                Left  Baker's cyst:                -                       -             Effusion:                      -                       -  Ballottement:               -                       -  Other:                          -                       -      JOINT STABILITY:           Right                Left  Medial collateral lig:    -                       -  Lateral collateral lig:    -                      -  Anterior draw sign:      -                      -  Posterior draw sign:    -                       -  Lachmans:                  -                       -     SPECIAL TESTS:                   Right                Left  Florence Test:                       -                       -  Patella  Grinding Test:            +                       -  Knee Joint Effusion Test:        -                       -     IMAGING:  XR right knee 04/22/2025 with noted tricompartmental osteoarthritis, kellgren mayur grade 2.     Data Reviewed: X-ray  Supportive Actions: Independent visualization of images or test specimens.    ASSESSMENT:   1. Primary osteoarthritis of right knee    2. Chronic pain of right knee    3. Patellofemoral pain syndrome of right knee        PLAN:   1. Time was spent reviewing the above diagnosis in depth with Irish today, including acute management and rehabilitation.     2. We discussed options for management of her pain  would be to consider injection of either short acting steroid or hyaluronic acid. The use, benefits, risks, and expectations of all of these types of injections was discussed at length with her today. At this time, she elects to proceed with ultrasound-guided right intra-articular knee steroid injection(s). This procedure was completed today in clinic. Please see procedure note for further details. We can repeat this every 3 months if appropriate.     3. We will also submit for right knee HA injection.      4. Order placed to start formal PT, script given to patient with plans for her to take to outside provider of her choice.     RTC 1 month.     45 minutes of total time spent on the encounter, which includes face to face time and non-face to face time preparing to see the patient (eg, review of tests), obtaining and/or reviewing separately obtained history, documenting clinical information in the electronic or other health record, independently interpreting results (not separately reported), communicating results to the patient/family/caregiver, and/or care coordination (not separately reported).          [1]   Current Outpatient Medications on File Prior to Visit   Medication Sig Dispense Refill    ARMOUR THYROID 60 mg Tab TK 1 T PO D  6    aspirin (ECOTRIN) 81 MG EC tablet Take 81 mg by mouth once daily.      FLUoxetine (PROZAC) 20 MG capsule Take 20 mg by mouth Every 3 (three) days.  5    fluticasone propionate (FLONASE) 50 mcg/actuation nasal spray SHAKE LQ AND U 1 SPR IEN QD      multivitamin capsule Take 1 capsule by mouth once daily.      rosuvastatin (CRESTOR) 20 MG tablet Take 20 mg by mouth once daily.      traMADol (ULTRAM) 50 mg tablet Take 50 mg by mouth every 8 (eight) hours as needed.       trazodone (DESYREL) 50 MG tablet Take 50 mg by mouth every evening. Takes 1/2 tablet HS      nitroGLYCERIN (NITROSTAT) 0.4 MG SL tablet TK ONE T PO PRN  6    ondansetron (ZOFRAN-ODT) 8 MG TbDL Take 1 tablet (8 mg  total) by mouth every 8 (eight) hours as needed (Nausea). 4 tablet 0    oxyCODONE (ROXICODONE) 5 MG immediate release tablet Take 1 tablet (5 mg total) by mouth every 4 (four) hours as needed for Pain. (Patient not taking: Reported on 4/22/2025) 10 tablet 0    valACYclovir (VALTREX) 1000 MG tablet        No current facility-administered medications on file prior to visit.

## 2025-05-05 ENCOUNTER — OFFICE VISIT (OUTPATIENT)
Dept: ORTHOPEDICS | Facility: CLINIC | Age: 70
End: 2025-05-05
Payer: MEDICARE

## 2025-05-05 DIAGNOSIS — M15.1 DEGENERATIVE ARTHRITIS OF DISTAL INTERPHALANGEAL JOINT OF INDEX FINGER OF RIGHT HAND: Primary | ICD-10-CM

## 2025-05-05 PROCEDURE — 1126F AMNT PAIN NOTED NONE PRSNT: CPT | Mod: CPTII,S$GLB,, | Performed by: ORTHOPAEDIC SURGERY

## 2025-05-05 PROCEDURE — 1159F MED LIST DOCD IN RCRD: CPT | Mod: CPTII,S$GLB,, | Performed by: ORTHOPAEDIC SURGERY

## 2025-05-05 PROCEDURE — 99999 PR PBB SHADOW E&M-EST. PATIENT-LVL II: CPT | Mod: PBBFAC,,, | Performed by: ORTHOPAEDIC SURGERY

## 2025-05-05 PROCEDURE — 99213 OFFICE O/P EST LOW 20 MIN: CPT | Mod: S$GLB,,, | Performed by: ORTHOPAEDIC SURGERY

## 2025-05-05 NOTE — PROGRESS NOTES
Ms Felder returns to clinic today.  Has a history of right index finger distal interphalangeal joint fusion.  She is doing very well.  Her pain is well-controlled.  She still has some numbness over the tip of the finger reported     Physical exam: Examination of the right index finger reveals that the wounds are all very well healed.  There is no edema or erythema.  Palpation does not produce tenderness.  She does have some decreased sensation overlying the very tip of the finger and underneath that the nailbed.  The rest of the finger as sensation intact.  He is able to flex and extend at the MCP and the PIP joints well.  She has capillary refill less than 2 seconds    Assessment: Status post right index finger distal interphalangeal joint arthrodesis     Plan:    1. We will continue to monitor the area of numbness.    2.  She can continue to use the hand in the finger for any activity as she tolerates     3. She will follow up with me if she has any significant changes to the finger or if she does not regained sensation in his bothered by the lack of sensation at the

## 2025-05-27 ENCOUNTER — TELEPHONE (OUTPATIENT)
Dept: PHYSICAL MEDICINE AND REHAB | Facility: CLINIC | Age: 70
End: 2025-05-27
Payer: MEDICARE

## 2025-05-27 ENCOUNTER — CLINICAL SUPPORT (OUTPATIENT)
Dept: PHYSICAL MEDICINE AND REHAB | Facility: CLINIC | Age: 70
End: 2025-05-27
Payer: MEDICARE

## 2025-05-27 VITALS
WEIGHT: 182.13 LBS | DIASTOLIC BLOOD PRESSURE: 58 MMHG | SYSTOLIC BLOOD PRESSURE: 117 MMHG | HEART RATE: 91 BPM | BODY MASS INDEX: 34.41 KG/M2

## 2025-05-27 DIAGNOSIS — M17.11 PRIMARY OSTEOARTHRITIS OF RIGHT KNEE: ICD-10-CM

## 2025-05-27 DIAGNOSIS — M25.561 CHRONIC PAIN OF RIGHT KNEE: Primary | ICD-10-CM

## 2025-05-27 DIAGNOSIS — M22.2X1 PATELLOFEMORAL PAIN SYNDROME OF RIGHT KNEE: ICD-10-CM

## 2025-05-27 DIAGNOSIS — G89.29 CHRONIC PAIN OF RIGHT KNEE: Primary | ICD-10-CM

## 2025-05-27 PROCEDURE — 99499 UNLISTED E&M SERVICE: CPT | Mod: S$GLB,,, | Performed by: NURSE PRACTITIONER

## 2025-05-27 PROCEDURE — 20610 DRAIN/INJ JOINT/BURSA W/O US: CPT | Mod: RT,S$GLB,, | Performed by: NURSE PRACTITIONER

## 2025-05-27 PROCEDURE — 99999 PR PBB SHADOW E&M-EST. PATIENT-LVL III: CPT | Mod: PBBFAC,,, | Performed by: NURSE PRACTITIONER

## 2025-05-27 RX ORDER — LIDOCAINE HYDROCHLORIDE 10 MG/ML
4 INJECTION, SOLUTION INFILTRATION; PERINEURAL
Status: DISCONTINUED | OUTPATIENT
Start: 2025-05-27 | End: 2025-05-27 | Stop reason: HOSPADM

## 2025-05-27 RX ORDER — KETOROLAC TROMETHAMINE 30 MG/ML
30 INJECTION, SOLUTION INTRAMUSCULAR; INTRAVENOUS
Status: DISCONTINUED | OUTPATIENT
Start: 2025-05-27 | End: 2025-05-27 | Stop reason: HOSPADM

## 2025-05-27 RX ADMIN — KETOROLAC TROMETHAMINE 30 MG: 30 INJECTION, SOLUTION INTRAMUSCULAR; INTRAVENOUS at 01:05

## 2025-05-27 RX ADMIN — LIDOCAINE HYDROCHLORIDE 4 ML: 10 INJECTION, SOLUTION INFILTRATION; PERINEURAL at 01:05

## 2025-05-27 NOTE — TELEPHONE ENCOUNTER
----- Message from MELANY Langford sent at 5/26/2025 11:24 AM CDT -----  Contact: Self    ----- Message -----  From: Kendrick Kay  Sent: 5/26/2025  11:17 AM CDT  To: [facility] Staff    Type: Needs Medical AdviceWho Called:  Patientst Call Back Number: [phone] Additional Information: Called to speak with office regarding 5/27 injection. Please call.

## 2025-05-27 NOTE — PROCEDURES
Large Joint Aspiration/Injection: R knee    Date/Time: 5/27/2025 1:00 PM    Performed by: Raven Hernandez FNP  Authorized by: Raven Hernandez FNP    Consent Done?:  Yes (Verbal)  Indications:  Arthritis and pain  Site marked: the procedure site was marked    Timeout: prior to procedure the correct patient, procedure, and site was verified    Prep: patient was prepped and draped in usual sterile fashion      Local anesthesia used?: Yes      Details:  Needle Size:  25 G  Ultrasonic Guidance for needle placement?: No    Approach:  Anterolateral  Location:  Knee  Site:  R knee  Medications:  4 mL LIDOcaine HCL 10 mg/ml (1%) 10 mg/mL (1 %); 30 mg ketorolac 30 mg/mL (1 mL)  Patient tolerance:  Patient tolerated the procedure well with no immediate complications

## 2025-05-27 NOTE — PROGRESS NOTES
[facility] PROCEDURE NOTE    CHIEF COMPLAINT:   Chief Complaint   Patient presents with    Follow-up     R-knee steroid , pt wants to discuss the injection.       HISTORY OF PRESENT ILLNESS: Irish Felder is a 69 y.o. female who presents to me for planned procedure/injection of Toradol for management of the below noted diagnosis.     Medications: Medications Ordered Prior to Encounter[1]    Allergies: Review of patient's allergies indicates:  No Known Allergies    Vitals:   Vitals:    05/27/25 1318   BP: (!) 117/58   Pulse: 91     ASSESSMENT:   1. Chronic pain of right knee    2. Primary osteoarthritis of right knee    3. Patellofemoral pain syndrome of right knee      PLAN:   1. Procedure/injection was completed for management of above diagnosis.    2. Please see procedure note from today's visit with further details.     Right intra-articular knee Toradol injection.     RTC for scheduled HA injection.          [1]   Current Outpatient Medications on File Prior to Visit   Medication Sig Dispense Refill    ARMOUR THYROID 60 mg Tab TK 1 T PO D  6    aspirin (ECOTRIN) 81 MG EC tablet Take 81 mg by mouth once daily.      FLUoxetine (PROZAC) 20 MG capsule Take 20 mg by mouth Every 3 (three) days.  5    fluticasone propionate (FLONASE) 50 mcg/actuation nasal spray SHAKE LQ AND U 1 SPR IEN QD      multivitamin capsule Take 1 capsule by mouth once daily.      nitroGLYCERIN (NITROSTAT) 0.4 MG SL tablet TK ONE T PO PRN  6    ondansetron (ZOFRAN-ODT) 8 MG TbDL Take 1 tablet (8 mg total) by mouth every 8 (eight) hours as needed (Nausea). 4 tablet 0    oxyCODONE (ROXICODONE) 5 MG immediate release tablet Take 1 tablet (5 mg total) by mouth every 4 (four) hours as needed for Pain. 10 tablet 0    rosuvastatin (CRESTOR) 20 MG tablet Take 20 mg by mouth once daily.      traMADol (ULTRAM) 50 mg tablet Take 50 mg by mouth every 8 (eight) hours as needed.       trazodone (DESYREL) 50 MG  tablet Take 50 mg by mouth every evening. Takes 1/2 tablet HS      valACYclovir (VALTREX) 1000 MG tablet        No current facility-administered medications on file prior to visit.

## 2025-06-04 ENCOUNTER — CLINICAL SUPPORT (OUTPATIENT)
Dept: PHYSICAL MEDICINE AND REHAB | Facility: CLINIC | Age: 70
End: 2025-06-04
Payer: MEDICARE

## 2025-06-04 VITALS
WEIGHT: 138.75 LBS | DIASTOLIC BLOOD PRESSURE: 58 MMHG | SYSTOLIC BLOOD PRESSURE: 121 MMHG | HEART RATE: 85 BPM | BODY MASS INDEX: 26.22 KG/M2

## 2025-06-04 DIAGNOSIS — M17.11 PRIMARY OSTEOARTHRITIS OF RIGHT KNEE: ICD-10-CM

## 2025-06-04 DIAGNOSIS — M25.561 CHRONIC PAIN OF RIGHT KNEE: Primary | ICD-10-CM

## 2025-06-04 DIAGNOSIS — G89.29 CHRONIC PAIN OF RIGHT KNEE: Primary | ICD-10-CM

## 2025-06-04 PROCEDURE — 99499 UNLISTED E&M SERVICE: CPT | Mod: S$GLB,,, | Performed by: NURSE PRACTITIONER

## 2025-06-04 PROCEDURE — 20611 DRAIN/INJ JOINT/BURSA W/US: CPT | Mod: RT,S$GLB,, | Performed by: NURSE PRACTITIONER

## 2025-06-04 PROCEDURE — 99999 PR PBB SHADOW E&M-EST. PATIENT-LVL III: CPT | Mod: PBBFAC,,, | Performed by: NURSE PRACTITIONER

## 2025-07-16 ENCOUNTER — OFFICE VISIT (OUTPATIENT)
Dept: PHYSICAL MEDICINE AND REHAB | Facility: CLINIC | Age: 70
End: 2025-07-16
Payer: MEDICARE

## 2025-07-16 VITALS — SYSTOLIC BLOOD PRESSURE: 117 MMHG | HEART RATE: 96 BPM | DIASTOLIC BLOOD PRESSURE: 53 MMHG

## 2025-07-16 DIAGNOSIS — M25.561 CHRONIC PAIN OF RIGHT KNEE: Primary | ICD-10-CM

## 2025-07-16 DIAGNOSIS — M22.2X1 PATELLOFEMORAL PAIN SYNDROME OF RIGHT KNEE: ICD-10-CM

## 2025-07-16 DIAGNOSIS — G89.29 CHRONIC PAIN OF RIGHT KNEE: Primary | ICD-10-CM

## 2025-07-16 DIAGNOSIS — M17.11 PRIMARY OSTEOARTHRITIS OF RIGHT KNEE: ICD-10-CM

## 2025-07-16 PROCEDURE — 99999 PR PBB SHADOW E&M-EST. PATIENT-LVL III: CPT | Mod: PBBFAC,,, | Performed by: PHYSICAL MEDICINE & REHABILITATION

## 2025-07-16 RX ORDER — TRIAMCINOLONE ACETONIDE 40 MG/ML
40 INJECTION, SUSPENSION INTRA-ARTICULAR; INTRAMUSCULAR
Status: DISCONTINUED | OUTPATIENT
Start: 2025-07-16 | End: 2025-07-16 | Stop reason: HOSPADM

## 2025-07-16 RX ORDER — LIDOCAINE HYDROCHLORIDE 10 MG/ML
4 INJECTION, SOLUTION INFILTRATION; PERINEURAL
Status: DISCONTINUED | OUTPATIENT
Start: 2025-07-16 | End: 2025-07-16 | Stop reason: HOSPADM

## 2025-07-16 RX ADMIN — LIDOCAINE HYDROCHLORIDE 4 ML: 10 INJECTION, SOLUTION INFILTRATION; PERINEURAL at 01:07

## 2025-07-16 RX ADMIN — TRIAMCINOLONE ACETONIDE 40 MG: 40 INJECTION, SUSPENSION INTRA-ARTICULAR; INTRAMUSCULAR at 01:07

## 2025-07-16 NOTE — PROCEDURES
Large Joint Aspiration/Injection: R knee    Date/Time: 7/16/2025 1:30 PM    Performed by: Raven Collazo, DO  Authorized by: Raven Collazo, DO    Consent Done?:  Yes (Verbal)  Indications:  Pain, joint swelling, arthritis and diagnostic evaluation  Site marked: the procedure site was marked    Timeout: prior to procedure the correct patient, procedure, and site was verified    Prep: patient was prepped and draped in usual sterile fashion      Local anesthesia used?: Yes    Anesthesia:  Local infiltration  Local anesthetic:  Lidocaine 1% without epinephrine  Anesthetic total (ml):  1      Details:  Needle Size:  18 G  Ultrasonic Guidance for needle placement?: Yes    Images are saved and documented.  Approach:  Lateral  Location:  Knee  Site:  R knee  Medications:  4 mL LIDOcaine HCL 10 mg/ml (1%) 10 mg/mL (1 %); 40 mg triamcinolone acetonide 40 mg/mL  Aspirate amount (mL):  15  Aspirate:  Serous and yellow  Patient tolerance:  Patient tolerated the procedure well with no immediate complications     Ultrasound guidance was used for correct needle placement, the images were saved and will be uploaded to EMR.

## 2025-07-16 NOTE — PROGRESS NOTES
[facility]    CHIEF COMPLAINT:   Chief Complaint   Patient presents with    Knee Pain     Right        HISTORY OF PRESENT ILLNESS: Irish Felder is a 69 y.o. female who presents to me for evaluation and management of right knee pain.     Chronic right knee pain with noted injury to knee stepping off boat with noted give way of knee back in October 2024.    Today reports, continued pain and swelling in knee. No relief from HA injection. Getting some relief from steroid and Toradol however not significant and swelling returns. She has consider surgery, however she is not ready at this time to pursue this route. Wants to continue with injections and aspirations for now.     Medications: tylenol PRN, tramadol for chronic low back pain  Injections:   - right knee steroid 004/22/2025  - right knee toradol 05/27/2025  - right knee HA 06/04/2025  Procedures:   - right knee deep genicular block 08/17/2017  Therapies: PT  Bracing: compression sleeve  DME: none    Review of Systems   Constitutional: Negative for fever.   HENT: Negative for drooling.    Eyes: Negative for discharge.   Respiratory: Negative for choking.    Cardiovascular: Negative for chest pain.   Genitourinary: Negative for flank pain.   Skin: Negative for wound.   Allergic/Immunologic: Negative for immunocompromised state.   Neurological: Negative for tremors and syncope.   Psychiatric/Behavioral: Negative for behavioral problems.     Past Medical History:   Past Medical History:   Diagnosis Date    Anemia     Cancer     skin cancer - nose    Chest pain 2016    GERD (gastroesophageal reflux disease)     Hyperlipidemia     Seasonal allergies        Past Surgical History:   Past Surgical History:   Procedure Laterality Date    AUGMENTATION OF BREAST Bilateral 2010    BACK SURGERY  09/2017    Cyst removed between L4-L5, Dr. Mercedes    BACK SURGERY  08/10/2020    L4-5 fusion, laminectomy    BREAST SURGERY Bilateral      2010    CATARACT EXTRACTION EXTRACAPSULAR W/ INTRAOCULAR LENS IMPLANTATION Bilateral 2012    CHOLECYSTECTOMY      COLONOSCOPY      EYE SURGERY Right 2014    retina surgery    FOOT GANGLION EXCISION Left 12/09/2019    Procedure: EXCISION, GANGLION CYST, FOOT;  Surgeon: Shawn Bañuelos MD;  Location: [facility] OR;  Service: Orthopedics;  Laterality: Left;    FUSION, JOINT, FINGER Right 11/19/2024    Procedure: Right index finger distal interphalangeal joint arthrodesis;  Surgeon: Kirk Morgan MD;  Location: [facility] OR;  Service: Orthopedics;  Laterality: Right;    HERNIA REPAIR      x2    HYSTERECTOMY  1985    complete    LASIK Bilateral 2004    OU    LUMBAR DISC SURGERY  08/2020    L3-4     OOPHORECTOMY  1985    TENDON RELEASE Right 2009    right heel    TONSILLECTOMY      TOTAL REDUCTION MAMMOPLASTY Bilateral 1983    TUMOR EXCISION Left     Below shoulder    UPPER GASTROINTESTINAL ENDOSCOPY         Family History:   Family History   Problem Relation Name Age of Onset    Cataracts Mother      Lung cancer Mother      Lung cancer Father      Diabetes Father      Cataracts Sister         Medications: Medications Ordered Prior to Encounter[1]    Allergies: Review of patient's allergies indicates:  No Known Allergies    Social History:   Social History     Socioeconomic History    Marital status:    Tobacco Use    Smoking status: Never    Smokeless tobacco: Never   Substance and Sexual Activity    Alcohol use: Not Currently     Comment: Since 2018    Drug use: No       PHYSICAL EXAMINATION:   Vitals:    07/16/25 1344   BP: (!) 117/53   BP Location: Left arm   Patient Position: Sitting   Pulse: 96     Constitutional: No apparent distress. Pleasant.  HENT: Trachea midline.  Head: Normocephalic and atraumatic.   Eyes: Right eye exhibits no discharge. Left eye exhibits no discharge. No scleral icterus.   CV: Well perfused.   Pulmonary/Chest: Effort normal. No respiratory distress.   Abdominal: There is no  guarding.   Neurological: Awake, alert and cooperative.  SKIN: Intact no apparent lesions, cut, ulcers or abrasions  EXT:  No cyanosis, clubbing, or edema.  SENSORY: Intact to light touch in the bilateral lower extemities.  KNEE EXAMINATION:  MUSCULOSKELETAL:   Muscle Strength:(0-5)                             Right     Left  Hip Flexors                5  5  Knee Extensors          5  5  Knee Flexors              5  5  Ankle Dorsiflex           5  5  Ankle Planterflex        5  5  EHL                            5  5    Reflexes: (0-4+/4)   Right     Left  Patellar(L4)  2+  2+  Ankle(S1)  2+  2+       INSPECTION:                                                                           Right                Left        Localized/Generalized swelling:                     -                       -  Muscle contours normal and symmetrical:     +                      +  Patellas symetrical and level:                         +                      +  Ecchymoses:                                                   -                       -  Erythema:                                                        -                       -  Gross deformity:                                             -                       -     KNEE DEFORMITY:            Right                Left  Normal:                       +                      +  Genu varus:                -                       -  Genu valgum:             -                       -  Genu Recurvatum:     -                       -     RANGE OF MOTION:           Right                Left  Flexion:                       Full                  Full        Extension:                   Full                  Full  Other:      TENDERNESS:                        Right                Left  Medial Tibial Plateau:             -                       -  Lateral Tibial Plateau:             -                       -  Medial Femoral Condyle:        -                       -  Lateral Femoral  Condyle:       -                       -  Head of the Fibula:                 -                       -  Medial joint line:                      +                       -  Lateral joint line:                      -                       -  Patella:                                    +                       -  Medial collateral lig:                -                       -  Lateral collateral lig:                -                       -  Pes Anserine:                         -                       -     SOFT TISSUE PALPATION:                                       Right                Left  Baker's cyst:                -                       -             Effusion:                      +                       -  Ballottement:               -                       -  Other:                          -                       -     IMAGING:  XR right knee 04/22/2025 with noted tricompartmental osteoarthritis, kellgren mayur grade 2.     Data Reviewed: X-ray  Supportive Actions: Independent visualization of images or test specimens.    ASSESSMENT:   1. Chronic pain of right knee    2. Patellofemoral pain syndrome of right knee    3. Primary osteoarthritis of right knee        PLAN:   1. Time was spent reviewing the above diagnosis in depth with Irish today, including acute management and rehabilitation.     2. Less than ideal resopnse to HA injection. We discussed conservative vs referral to Ortho for which she is not ready for at this time. Plan to repeat steroid every 3 months with aspirations for now and consider toradol injections with aspiration between.     3. In the mean time, we will obtain MRI right knee to further assess soft tissue pathology to consider areas of treatment/management with Ortho in the future.     RTC as needed.     30 minutes of total time spent on the encounter, which includes face to face time and non-face to face time preparing to see the patient (eg, review of tests), obtaining and/or  reviewing separately obtained history, documenting clinical information in the electronic or other health record, independently interpreting results (not separately reported), communicating results to the patient/family/caregiver, and/or care coordination (not separately reported).        [1]   Current Outpatient Medications on File Prior to Visit   Medication Sig Dispense Refill    ARMOUR THYROID 60 mg Tab TK 1 T PO D  6    aspirin (ECOTRIN) 81 MG EC tablet Take 81 mg by mouth once daily.      FLUoxetine (PROZAC) 20 MG capsule Take 20 mg by mouth Every 3 (three) days.  5    fluticasone propionate (FLONASE) 50 mcg/actuation nasal spray SHAKE LQ AND U 1 SPR IEN QD      multivitamin capsule Take 1 capsule by mouth once daily.      nitroGLYCERIN (NITROSTAT) 0.4 MG SL tablet TK ONE T PO PRN  6    ondansetron (ZOFRAN-ODT) 8 MG TbDL Take 1 tablet (8 mg total) by mouth every 8 (eight) hours as needed (Nausea). 4 tablet 0    oxyCODONE (ROXICODONE) 5 MG immediate release tablet Take 1 tablet (5 mg total) by mouth every 4 (four) hours as needed for Pain. 10 tablet 0    rosuvastatin (CRESTOR) 20 MG tablet Take 20 mg by mouth once daily.      traMADol (ULTRAM) 50 mg tablet Take 50 mg by mouth every 8 (eight) hours as needed.       trazodone (DESYREL) 50 MG tablet Take 50 mg by mouth every evening. Takes 1/2 tablet HS      valACYclovir (VALTREX) 1000 MG tablet        No current facility-administered medications on file prior to visit.

## 2025-08-26 ENCOUNTER — TELEPHONE (OUTPATIENT)
Dept: PHYSICAL MEDICINE AND REHAB | Facility: CLINIC | Age: 70
End: 2025-08-26
Payer: MEDICARE

## 2025-08-28 ENCOUNTER — CLINICAL SUPPORT (OUTPATIENT)
Dept: PHYSICAL MEDICINE AND REHAB | Facility: CLINIC | Age: 70
End: 2025-08-28
Payer: MEDICARE

## 2025-08-28 VITALS — SYSTOLIC BLOOD PRESSURE: 117 MMHG | DIASTOLIC BLOOD PRESSURE: 71 MMHG | HEART RATE: 88 BPM

## 2025-08-28 DIAGNOSIS — M25.561 CHRONIC PAIN OF RIGHT KNEE: Primary | ICD-10-CM

## 2025-08-28 DIAGNOSIS — M17.11 PRIMARY OSTEOARTHRITIS OF RIGHT KNEE: ICD-10-CM

## 2025-08-28 DIAGNOSIS — G89.29 CHRONIC PAIN OF RIGHT KNEE: Primary | ICD-10-CM

## 2025-08-28 PROCEDURE — 99999 PR PBB SHADOW E&M-EST. PATIENT-LVL III: CPT | Mod: PBBFAC,,, | Performed by: PHYSICAL MEDICINE & REHABILITATION

## 2025-08-28 RX ORDER — LIDOCAINE HYDROCHLORIDE 10 MG/ML
4 INJECTION, SOLUTION INFILTRATION; PERINEURAL
Status: DISCONTINUED | OUTPATIENT
Start: 2025-08-28 | End: 2025-08-28 | Stop reason: HOSPADM

## 2025-08-28 RX ORDER — KETOROLAC TROMETHAMINE 30 MG/ML
30 INJECTION, SOLUTION INTRAMUSCULAR; INTRAVENOUS
Status: DISCONTINUED | OUTPATIENT
Start: 2025-08-28 | End: 2025-08-28 | Stop reason: HOSPADM

## 2025-08-28 RX ADMIN — LIDOCAINE HYDROCHLORIDE 4 ML: 10 INJECTION, SOLUTION INFILTRATION; PERINEURAL at 10:08

## 2025-08-28 RX ADMIN — KETOROLAC TROMETHAMINE 30 MG: 30 INJECTION, SOLUTION INTRAMUSCULAR; INTRAVENOUS at 10:08

## (undated) DEVICE — SEE MEDLINE ITEM 152622

## (undated) DEVICE — SEE MEDLINE ITEM 152678

## (undated) DEVICE — LINER SUCTION 3000CC

## (undated) DEVICE — DRAPE STERI-DRAPE 1000 17X11IN

## (undated) DEVICE — SEE MEDLINE ITEM 157131

## (undated) DEVICE — NDL HYPO 27G X 1 1/2

## (undated) DEVICE — BLADE SURG #15 CARBON STEEL

## (undated) DEVICE — DURAPREP SURG SCRUB 26ML

## (undated) DEVICE — GAUZE SPONGE 8X4 12 PLY

## (undated) DEVICE — DRESSING XEROFORM 1X8IN

## (undated) DEVICE — ALCOHOL 70% ISOP RUBBING 4OZ

## (undated) DEVICE — GAUZE SPONGE 4X4 12PLY

## (undated) DEVICE — CUP MEDICINE STERILE 2OZ

## (undated) DEVICE — SEE MEDLINE ITEM 146298

## (undated) DEVICE — HANDLE SURG LIGHT NONRIGID

## (undated) DEVICE — BANDAGE ELAS SOFTWRAP ST 3X5YD

## (undated) DEVICE — BLADE SAGITTAL FINE 5.5 X 18.5

## (undated) DEVICE — SYR 10CC LUER LOCK

## (undated) DEVICE — PAD ABD 8X10 STERILE

## (undated) DEVICE — TOURNIQUET SB QC DP 18X4IN

## (undated) DEVICE — NDL SPINAL 25GX3.5 SPINOCAN

## (undated) DEVICE — SYR DISP LL 5CC

## (undated) DEVICE — Device

## (undated) DEVICE — SEE MEDLINE ITEM 152487

## (undated) DEVICE — APPLICATOR CHLORAPREP ORN 26ML

## (undated) DEVICE — ELECTRODE REM PLYHSV RETURN 9

## (undated) DEVICE — SAW BLADE

## (undated) DEVICE — PAD CAST SPECIALIST STRL 4

## (undated) DEVICE — SOL IRR 0.9% NACL 500ML PB

## (undated) DEVICE — APPLICATOR CHLORAPREP CLR 10.5

## (undated) DEVICE — DRESSING XEROFORM NONADH 1X8IN

## (undated) DEVICE — DRAPE U SPLIT SHEET 54X76IN

## (undated) DEVICE — DRAPE HAND STERILE

## (undated) DEVICE — MARKER SKIN STND TIP BLUE BARR

## (undated) DEVICE — KIT SAHARA DRAPE DRAW/LIFT

## (undated) DEVICE — SEE MEDLINE ITEM 157128

## (undated) DEVICE — DRAPE C ARM 42 X 120 10/BX

## (undated) DEVICE — SEE MEDLINE ITEM 157117

## (undated) DEVICE — IMPLANTABLE DEVICE
Type: IMPLANTABLE DEVICE | Site: FINGER | Status: NON-FUNCTIONAL
Removed: 2024-11-19

## (undated) DEVICE — GLOVE PROTEXIS LTX MICRO 8

## (undated) DEVICE — FORCEP STRAIGHT DISP

## (undated) DEVICE — CATH IV OPTIVA OCRILON

## (undated) DEVICE — GLOVE SENSICARE PI ALOE 8

## (undated) DEVICE — BANDAGE MATRIX HK LOOP 2IN 5YD

## (undated) DEVICE — DRESSING XEROFORM FOIL PK 1X8

## (undated) DEVICE — BANDAGE CONFORM 3IN STRL

## (undated) DEVICE — GLOVE PROTEXIS LTX MICRO  7.5

## (undated) DEVICE — PAD CAST 2 IN X 4YDS STERILE

## (undated) DEVICE — SEE MEDLINE ITEM 146308

## (undated) DEVICE — PAD CAST SPECIALIST STRL 3

## (undated) DEVICE — GLOVE SURGICAL LATEX SZ 8

## (undated) DEVICE — BANDAGE MATRIX HK LOOP 4IN 5YD

## (undated) DEVICE — DRAPE THREE-QTR REINF 53X77IN

## (undated) DEVICE — STRAP OR TABLE 5IN X 72IN

## (undated) DEVICE — SPLINT PLASTER FAST SET 5X30IN

## (undated) DEVICE — LABEL FOR UTILITY MARKER

## (undated) DEVICE — SPONGE COTTON TRAY 4X4IN

## (undated) DEVICE — CORD BIPOLAR 12 FOOT

## (undated) DEVICE — BOWL STERILE LARGE 32OZ

## (undated) DEVICE — SUT 4-0 ETHILON 18 PS-2

## (undated) DEVICE — GLOVE SURGICAL LATEX SZ 6.5